# Patient Record
Sex: FEMALE | Race: ASIAN
[De-identification: names, ages, dates, MRNs, and addresses within clinical notes are randomized per-mention and may not be internally consistent; named-entity substitution may affect disease eponyms.]

---

## 2017-07-27 ENCOUNTER — HOSPITAL ENCOUNTER (EMERGENCY)
Dept: HOSPITAL 10 - E/R | Age: 77
Discharge: HOME | End: 2017-07-27
Payer: COMMERCIAL

## 2017-07-27 VITALS — DIASTOLIC BLOOD PRESSURE: 68 MMHG | SYSTOLIC BLOOD PRESSURE: 123 MMHG | RESPIRATION RATE: 16 BRPM | HEART RATE: 74 BPM

## 2017-07-27 VITALS
HEIGHT: 58 IN | BODY MASS INDEX: 24.06 KG/M2 | BODY MASS INDEX: 24.06 KG/M2 | WEIGHT: 114.64 LBS | HEIGHT: 58 IN | WEIGHT: 114.64 LBS

## 2017-07-27 DIAGNOSIS — R10.84: ICD-10-CM

## 2017-07-27 DIAGNOSIS — Z79.4: ICD-10-CM

## 2017-07-27 DIAGNOSIS — R11.2: Primary | ICD-10-CM

## 2017-07-27 DIAGNOSIS — Z79.84: ICD-10-CM

## 2017-07-27 DIAGNOSIS — R41.82: ICD-10-CM

## 2017-07-27 DIAGNOSIS — E11.65: ICD-10-CM

## 2017-07-27 LAB
ADD UMIC: YES
ALBUMIN SERPL-MCNC: 4.7 G/DL (ref 3.3–4.9)
ALBUMIN/GLOB SERPL: 1.17 {RATIO}
ALP SERPL-CCNC: 50 IU/L (ref 42–121)
ALT SERPL-CCNC: 23 IU/L (ref 13–69)
ANION GAP SERPL CALC-SCNC: 23 MMOL/L (ref 8–16)
APTT BLD: 30.9 SEC (ref 25–35)
AST SERPL-CCNC: 27 IU/L (ref 15–46)
BASOPHILS # BLD AUTO: 0 10^3/UL (ref 0–0.1)
BASOPHILS NFR BLD: 0.3 % (ref 0–2)
BILIRUB DIRECT SERPL-MCNC: 0 MG/DL (ref 0–0.2)
BILIRUB SERPL-MCNC: 0.2 MG/DL (ref 0.2–1.3)
BUN SERPL-MCNC: 21 MG/DL (ref 7–20)
CALCIUM SERPL-MCNC: 9.6 MG/DL (ref 8.4–10.2)
CHLORIDE SERPL-SCNC: 101 MMOL/L (ref 97–110)
CO2 SERPL-SCNC: 27 MMOL/L (ref 21–31)
COLOR UR: (no result)
CREAT SERPL-MCNC: 0.81 MG/DL (ref 0.44–1)
EOSINOPHIL # BLD: 0 10^3/UL (ref 0–0.5)
EOSINOPHIL NFR BLD: 0.4 % (ref 0–7)
ERYTHROCYTE [DISTWIDTH] IN BLOOD BY AUTOMATED COUNT: 13 % (ref 11.5–14.5)
GLOBULIN SER-MCNC: 4 G/DL (ref 1.3–3.2)
GLUCOSE SERPL-MCNC: 66 MG/DL (ref 70–220)
GLUCOSE UR STRIP-MCNC: (no result) MG/DL
HCT VFR BLD CALC: 41.7 % (ref 37–47)
HGB BLD-MCNC: 13.8 G/DL (ref 12–16)
INR PPP: 0.87
KETONES UR STRIP.AUTO-MCNC: NEGATIVE MG/DL
LYMPHOCYTES # BLD AUTO: 1.9 10^3/UL (ref 0.8–2.9)
LYMPHOCYTES NFR BLD AUTO: 23.3 % (ref 15–51)
MCH RBC QN AUTO: 30.4 PG (ref 29–33)
MCHC RBC AUTO-ENTMCNC: 33.1 G/DL (ref 32–37)
MCV RBC AUTO: 91.9 FL (ref 82–101)
MONOCYTES # BLD: 0.4 10^3/UL (ref 0.3–0.9)
MONOCYTES NFR BLD: 5.5 % (ref 0–11)
NEUTROPHILS # BLD: 5.6 10^3/UL (ref 1.6–7.5)
NEUTROPHILS NFR BLD AUTO: 70.2 % (ref 39–77)
NITRITE UR QL STRIP.AUTO: NEGATIVE MG/DL
NRBC # BLD MANUAL: 0 10^3/UL (ref 0–0)
NRBC BLD AUTO-RTO: 0 /100WBC (ref 0–0)
PLATELET # BLD: 302 10^3/UL (ref 140–415)
PMV BLD AUTO: 10 FL (ref 7.4–10.4)
POTASSIUM SERPL-SCNC: 4.2 MMOL/L (ref 3.5–5.1)
PROT SERPL-MCNC: 8.7 G/DL (ref 6.1–8.1)
PROTHROMBIN TIME: 11.8 SEC (ref 12.2–14.2)
PT RATIO: 0.9
RBC # BLD AUTO: 4.54 10^6/UL (ref 4.2–5.4)
RBC # UR AUTO: (no result) MG/DL
SODIUM SERPL-SCNC: 147 MMOL/L (ref 135–144)
TROPONIN I SERPL-MCNC: < 0.012 NG/ML (ref 0–0.12)
UR ASCORBIC ACID: NEGATIVE MG/DL
UR BILIRUBIN (DIP): NEGATIVE MG/DL
UR CLARITY: CLEAR
UR PH (DIP): 6 (ref 5–9)
UR RBC: 0 /HPF (ref 0–5)
UR SPECIFIC GRAVITY (DIP): 1 (ref 1–1.03)
UR TOTAL PROTEIN (DIP): NEGATIVE MG/DL
UROBILINOGEN UR STRIP-ACNC: NEGATIVE MG/DL
WBC # BLD AUTO: 8 10^3/UL (ref 4.8–10.8)
WBC # UR STRIP: (no result) LEU/UL

## 2017-07-27 PROCEDURE — 96374 THER/PROPH/DIAG INJ IV PUSH: CPT

## 2017-07-27 PROCEDURE — 85610 PROTHROMBIN TIME: CPT

## 2017-07-27 PROCEDURE — 74176 CT ABD & PELVIS W/O CONTRAST: CPT

## 2017-07-27 PROCEDURE — 82962 GLUCOSE BLOOD TEST: CPT

## 2017-07-27 PROCEDURE — 96375 TX/PRO/DX INJ NEW DRUG ADDON: CPT

## 2017-07-27 PROCEDURE — 85025 COMPLETE CBC W/AUTO DIFF WBC: CPT

## 2017-07-27 PROCEDURE — 71010: CPT

## 2017-07-27 PROCEDURE — 93005 ELECTROCARDIOGRAM TRACING: CPT

## 2017-07-27 PROCEDURE — 80053 COMPREHEN METABOLIC PANEL: CPT

## 2017-07-27 PROCEDURE — 85730 THROMBOPLASTIN TIME PARTIAL: CPT

## 2017-07-27 PROCEDURE — 81001 URINALYSIS AUTO W/SCOPE: CPT

## 2017-07-27 PROCEDURE — 96372 THER/PROPH/DIAG INJ SC/IM: CPT

## 2017-07-27 PROCEDURE — 84484 ASSAY OF TROPONIN QUANT: CPT

## 2017-07-27 PROCEDURE — 36415 COLL VENOUS BLD VENIPUNCTURE: CPT

## 2017-07-27 PROCEDURE — 70450 CT HEAD/BRAIN W/O DYE: CPT

## 2017-07-27 NOTE — RADRPT
PROCEDURE:   CT Brain without contrast. 

 

CLINICAL INDICATION:    Altered Mental Status 

 

TECHNIQUE:   A CT of the brain was performed on a GE [employer] 64-slice CT scanner utilizing axial 
imaging from the skull base through the vertex without IV contrast.  Multiplanar reformatted images 
were made.  Images were reviewed on a PACS workstation.  The CTDIvol is 43.58 mGy and the DLP is 630
.2 mGycm.  

 

COMPARISON:   CT brain 03/09/2016 and CT brain 03/06/2014 

 

FINDINGS:

There is no acute intracranial hemorrhage, midline shift, or mass effect.  No abnormal extra-axial f
luid collection is seen. The gray-white matter differentiation appears well preserved.

There is stable mild to moderate diffuse cerebral volume loss, likely age-related.

The previously seen focal encephalomalacia likely related to prior infarct in the anterior limb of t
he left internal capsule is identified, but is less conspicuous on the current study as compared to 
prior studies.

Scattered low attenuation in the periventricular deep white matter is nonspecific, but there compati
ble with chronic small vessel ischemic white matter disease, unchanged. Bilateral basal ganglia calc
ifications are unchanged and nonspecific.

A tiny posterior inferior right cerebellar hemisphere chronic lacunar infarct is unchanged.

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 The orbits, paranasal sinus, and mastoid air cells are partially i
jessica.  The imaged portions are otherwise grossly unremarkable.

Intracranial calcific atherosclerotic disease is noted in the internal carotid arteries bilaterally.

No acute fracture or suspicious osseous lesion is identified.

 

IMPRESSION:

 

1.  No evidence of acute intracranial pathology.

2.  Stable tiny chronic lacunar infarct in the right posterior inferior cerebellar hemisphere.

3. Focal encephalomalacia in the anterior limb of the left internal capsule, less conspicuous as com
pared to prior studies.

4. Stable mild to moderate diffuse cerebral volume loss, likely age-related.

5. Stable findings suggestive of mild chronic small vessel ischemic white matter disease.

 

 

RPTAT: PP

_____________________________________________ 

Physician Bryan           Date    Time 

Electronically viewed and signed by Physician Bryan on 07/27/2017 10:12 

 

D:  07/27/2017 10:12  T:  07/27/2017 10:12

RC/

## 2017-07-27 NOTE — RADRPT
PROCEDURE:   CT Abdomen and Pelvis without intravenous contrast. 

 

CLINICAL INDICATION:   Abdominal pain and vomiting.  . 

 

TECHNIQUE:   CT scan of the abdomen and pelvis without intravenous contrast was performed on a multi
-slice CT scanner. Coronal and sagittal reformatted images were obtained from the axial source image
s. Images were reviewed on a high-resolution PACS workstation. 

Total DLP =  296.8 mGy-cm. CTDIvol = 5.3 mGy.

One or more of the following dose reduction techniques were used:

Automated exposure control.

Adjustment of the mA and/or kV according to patient size.

Use of iterative reconstruction technique.

 

COMPARISON:   06/05/2016 

 

FINDINGS:

 

CT abdomen and pelvis:

 

The lung bases are clear. Calcified subcarinal lymph node is seen. The heart size is normal in size.
  The liver is normal in size and density.  There is intra and extrahepatic biliary dilatation with 
pneumobilia unchanged.  There is an internal biliary stent in place extending from the common bile d
uct into the second portion of the duodenum. There is no definite common bile duct stone or pancreat
ic mass is seen although sludge is seen at the distal common bile duct.  The spleen is normal in siz
e and homogeneous in density.   The pancreas as visualized is normal.  The gallbladder has been stehpanie
josué.  The adrenal glands are normal.  The kidneys are symmetrically unremarkable.  No urolithiasis, 
obstructive uropathy, or solid mass lesion is seen. There are bilateral renal cysts measuring up to 
5.5 cm in the left kidney unchanged. 

 

The stomach is partially collapsed, but is grossly unremarkable. The small bowels are unremarkable. 
The colon and rectum are normal. There are a few scattered colonic diverticula.  The appendix is nor
mal.  There is no evidence of appendicitis or diverticulitis. The uterus has been removed.. The blad
nayeli is normal. There is no abdominal or pelvic adenopathy, free fluid, free air, mass or mesenteric 
inflammation. 

 

The aorta is normal in caliber with calcific atherosclerosis . The osseous structures  showing degen
erative enthesopathy of the spine. There are no osteolytic or osteoblastic lesions identified..  The
 soft tissues are within normal limits.  Lack of IV and oral contrast limits sensitivity of exam. 

 

IMPRESSION:

 

1.  Intra and extrahepatic biliary dilatation with pneumobilia and internal biliary stent in place u
nchanged since prior exam.

2.  Bilateral renal cysts.

3.  Colonic diverticulosis without diverticulitis.

4.  Status post cholecystectomy and hysterectomy.

5.  Otherwise unremarkable noncontrast CT abdomen and pelvis without acute pathology identified.

 

RPTAT: JJ

_____________________________________________ 

.Tito Gamez MD, MD           Date    Time 

Electronically viewed and signed by .Tito Gamez MD, MD on 07/27/2017 10:10 

 

D:  07/27/2017 10:10  T:  07/27/2017 10:10

.L/

## 2017-07-27 NOTE — ERA
ER Documentation


Chief Complaint


Date/Time


DATE: 7/27/17 


TIME: 11:56


Chief Complaint


feels weak , dizziness , low blood glucose , abd pain , vomiting





HPI


This is a 77-year-old female diabetic who presents the emergency room with 

fatigue dizziness and vomiting.  The patient has multiple complaints usually 

history is provided by daughter.  The patient describes mild headache, 

abdominal pain that is cramping with associated nonbloody nonbilious emesis.  

Accu-Chek at home was in the 60s and she was given something to drink.  No 

recent fevers or chills.  The patient has had bladder infections in the past 

she denies any dysuria urgency or frequency.





ROS


All systems reviewed and are negative except as per history of present illness.





Medications


Home Meds


Active Scripts


Ondansetron (Ondansetron Odt) 4 Mg Tab.rapdis, 4 MG PO Q6H Y for NAUSEA AND/OR 

VOMITING, #30 TAB


   Prov:ZHEN VALERIO MD         7/27/17


Reported Medications


Insulin Glargine* (Lantus*) 100 Unit/Ml Soln, 14 UNIT SC DAILY, #1 VIAL


   7/27/17


Glipizide* (Glipizide*) 10 Mg Tablet, 10 MG PO AC BREAKFAST BEDTIME, TAB


   2/14/15


Omeprazole* (Prilosec*) 20 Mg Capsule.dr, 20 MG PO DAILY, CAP


   11/17/14


Losartan Potassium* (Losartan Potassium*) 25 Mg Tablet, 25 MG PO DAILY, TAB


   11/17/14


Pioglitazone Hcl* (Actos*) 45 Mg Tablet, 45 MG PO DAILY, TAB


   11/17/14


Metformin Hcl* (Metformin Hcl*) 850 Mg Tablet, 850 MG PO BID, TAB


   11/17/14


Discontinued Scripts


Ondansetron Hcl* (Zofran*) 4 Mg Tablet, 4 MG PO Q8H Y for NAUSEA AND/OR VOMITING

, #12 TAB


   Prov:MARTHA WALL DO         6/5/16


Famotidine* (Pepcid*) 20 Mg Tablet, 20 MG PO BID for 14 Days, TAB


   Prov:MARTHA WALL DO         6/5/16


Tramadol Hcl* (Ultram*) 50 Mg Tablet, 50 MG PO Q6H Y for PAIN, #14 TAB


   Prov:WINDY LOZANO DO         3/22/16


Omeprazole* (Omeprazole*) 40 Mg Capsule.dr, 40 MG PO DAILY, #30 CAP


   Prov:WINDY LOZANO DO         3/22/16


Ondansetron Hcl* (Zofran* ODT) 4 mg -ODT Tab.disper, 4 MG PO Q6 Y for NAUSEA AND

/OR VOMITING, #10 TAB


   Prov:WINDY LOZANO DO         3/22/16


Hydrocodone Bit-Acetaminophen* (Norco*) 5-325 Mg Tab, 1 TAB PO q6h Y for PAIN, #

10 TAB


   Prov:KHANH ENG NP         3/11/16


Levofloxacin* (Levofloxacin*) 250 Mg Tablet, 250 MG PO DAILY for 3 Days, TAB


   Prov:KHANH ENG NP         3/11/16





Allergies


Allergies:  


Coded Allergies:  


     No Known Allergy (Unverified , 7/27/17)





PMhx/Soc


History of Surgery:  Yes (cholecystectomy, hysterectomy)


Anesthesia Reaction:  No


Hx Neurological Disorder:  No


Hx Respiratory Disorders:  No


Hx Cardiac Disorders:  No


Hx Psychiatric Problems:  No


Hx Miscellaneous Medical Probl:  Yes (dm)


Hx Alcohol Use:  No


Hx Substance Use:  No


Hx Tobacco Use:  No


Smoking Status:  Never smoker





FmHx


Family History:  diabetes





Physical Exam


Vitals





Vital Signs








  Date Time  Temp Pulse Resp B/P Pulse Ox O2 Delivery O2 Flow Rate FiO2


 


7/27/17 08:19 97.8 87 18 149/67 99   








Physical Exam


General: Thin female, no significant distress


Head: Normocephalic, atraumatic.


Eyes: Pupils equally reactive, EOM intact


ENT: Moist mucous membranes


Neck: Supple, no lymphadenopathy


Respiratory: Lungs clear bilaterally, no distress


Cardiovascular: RRR, no murmurs, rubs, or gallops


Abdominal: Soft, non-tender, non-distended, no peritoneal signs


: Deferred


MSK: No edema, no unilateral swelling, 5/5 strength


Neurologic: Alert and oriented, moving all extremities, normal speech, no focal 

weakness, no cerebellar signs


Skin: No rash


Psych: Normal mood


Result Diagram:  


7/27/17 0900                                                                   

             7/27/17 0900





Results 24 hrs





 Laboratory Tests








Test


  7/27/17


08:31 7/27/17


09:00 7/27/17


09:21 7/27/17


10:27


 


Bedside Glucose 69mg/dL   201mg/dL  175mg/dL 


 


White Blood Count  8.010^3/ul   


 


Red Blood Count  4.5410^6/ul   


 


Hemoglobin  13.8g/dl   


 


Hematocrit  41.7%   


 


Mean Corpuscular Volume  91.9fl   


 


Mean Corpuscular Hemoglobin  30.4pg   


 


Mean Corpuscular Hemoglobin


Concent 


  33.1g/dl 


  


  


 


 


Red Cell Distribution Width  13.0%   


 


Platelet Count  27230^3/UL   


 


Mean Platelet Volume  10.0fl   


 


Neutrophils %  70.2%   


 


Lymphocytes %  23.3%   


 


Monocytes %  5.5%   


 


Eosinophils %  0.4%   


 


Basophils %  0.3%   


 


Nucleated Red Blood Cells %  0.0/100WBC   


 


Neutrophils #  5.610^3/ul   


 


Lymphocytes #  1.910^3/ul   


 


Monocytes #  0.410^3/ul   


 


Eosinophils #  0.010^3/ul   


 


Basophils #  0.010^3/ul   


 


Nucleated Red Blood Cells #  0.010^3/ul   


 


Prothrombin Time  11.8Sec   


 


Prothrombin Time Ratio  0.9   


 


INR International Normalized


Ratio 


  0.87 


  


  


 


 


Activated Partial


Thromboplast Time 


  30.9Sec 


  


  


 


 


Urine Color  STRAW   


 


Urine Clarity  CLEAR   


 


Urine pH  6.0   


 


Urine Specific Gravity  1.003   


 


Urine Ketones  NEGATIVEmg/dL   


 


Urine Nitrite  NEGATIVEmg/dL   


 


Urine Bilirubin  NEGATIVEmg/dL   


 


Urine Urobilinogen  NEGATIVEmg/dL   


 


Urine Leukocyte Esterase  TRACELeu/ul   


 


Urine Microscopic RBC  0/HPF   


 


Urine Microscopic WBC  2/HPF   


 


Urine Hemoglobin  1+mg/dL   


 


Urine Glucose  3+mg/dL   


 


Urine Total Protein  NEGATIVEmg/dl   


 


Sodium Level  147mmol/L   


 


Potassium Level  4.2mmol/L   


 


Chloride Level  101mmol/L   


 


Carbon Dioxide Level  27mmol/L   


 


Anion Gap  23   


 


Blood Urea Nitrogen  21mg/dl   


 


Creatinine  0.81mg/dl   


 


Glucose Level  66mg/dl   


 


Calcium Level  9.6mg/dl   


 


Total Bilirubin  0.2mg/dl   


 


Direct Bilirubin  0.00mg/dl   


 


Indirect Bilirubin  0.2mg/dl   


 


Aspartate Amino Transf


(AST/SGOT) 


  27IU/L 


  


  


 


 


Alanine Aminotransferase


(ALT/SGPT) 


  23IU/L 


  


  


 


 


Alkaline Phosphatase  50IU/L   


 


Troponin I  < 0.012ng/ml   


 


Total Protein  8.7g/dl   


 


Albumin  4.7g/dl   


 


Globulin  4.00g/dl   


 


Albumin/Globulin Ratio  1.17   














Test


  7/27/17


11:29 


  


  


 


 


Bedside Glucose 102mg/dL    








 Current Medications








 Medications


  (Trade)  Dose


 Ordered  Sig/Poly


 Route


 PRN Reason  Start Time


 Stop Time Status Last Admin


Dose Admin


 


 Sodium Chloride


  (NS)  500 ml @ 


 500 mls/hr  Q1H STAT


 IV


   7/27/17 08:31


 7/27/17 09:30 DC 7/27/17 08:42


 


 


 Dextrose


  (D50w Syringe)  50 ml  ONCE  STAT


 IV


   7/27/17 08:39


 7/27/17 08:43 DC 7/27/17 08:53


 


 


 Octreotide Acetate


  (Sandostatin)  50 mcg  ONCE  STAT


 SC


   7/27/17 08:39


 7/27/17 08:43 DC 7/27/17 10:16


 


 


 Ondansetron HCl


  (Zofran Inj)  4 mg  ONCE  STAT


 IV


   7/27/17 08:55


 7/27/17 08:57 DC 7/27/17 09:05


 











Procedures/MDM


EKG, MONITORS, & DIAGNOSTIC IMAGING:


EKG: I reviewed and interpreted a 12-lead EKG. 


Rhythm: Normal sinus rhythm


Ectopy: None


Intervals: No abnormalities


ST segments: No elevations or depressions


T waves: No contiguous inversions





CTB


IMPRESSION:


 


1.  No evidence of acute intracranial pathology.


2.  Stable tiny chronic lacunar infarct in the right posterior inferior 

cerebellar hemisphere.


3. Focal encephalomalacia in the anterior limb of the left internal capsule, 

less conspicuous as compared to prior studies.


4. Stable mild to moderate diffuse cerebral volume loss, likely age-related.


5. Stable findings suggestive of mild chronic small vessel ischemic white 

matter disease.


 


 


RPTAT: PP





CT a/p


IMPRESSION:


 


1.  Intra and extrahepatic biliary dilatation with pneumobilia and internal 

biliary stent in place unchanged since prior exam.


2.  Bilateral renal cysts.


3.  Colonic diverticulosis without diverticulitis.


4.  Status post cholecystectomy and hysterectomy.


5.  Otherwise unremarkable noncontrast CT abdomen and pelvis without acute 

pathology identified.


 


RPTAT: PAU


'Chest x-ray: I reviewed and interpreted a 1 view of the chest


Mediastinum: No enlargement


Cardiac silhouette: No cardiomegaly


Airspace: Clear lung fields bilaterally without evidence of pneumothorax


Bones: No evidence of fracture














LAB INTERPRETATION:


The patient has no leukocytosis,, stable serial  glucose values





MEDICAL DECISION MAKING:


The patient presents with hypoglycemia, multiple complaints including headache 

abdominal pain and nausea and vomiting.  Unclear etiology, the patient does 

take sulfonylurea and Lantus and took the doses this morning.  For this reason 

she requires close observation in the emergency room.  Given her age I believe 

laboratory testing and CT imaging of the head and abdomen and pelvis would be 

most reasonable.





ER COURSE:


The patient continues to be well-appearing in the emergency department.  The 

patient was given a complex carbohydrate meal she was given octreotide.  The 

patient's Accu-Chek initially was 201 and after an amp and has trended down to 

100 but has been stable for at least 3 hours.





The patient was given a p.o. challenge and tolerated this well.  She was 

observed with serial abdominal exams that are again benign.  I discussed 

inpatient versus outpatient management.  The patient and family feel 

comfortable going home with return precautions discussed and understood.  The 

daughter can check the glucose at home and was advised that if is low to call [phone] 

11 and return to the emergency department.





I kept the patient and/or family informed of laboratory and diagnostic imaging 

results throughout the emergency room course.





DISPOSITION PLAN:


We discussed follow up with the patient's primary care doctor within 24 to 48 

hours as needed.  We also discussed return to the emergency room for worsening 

symptoms or worsening condition.





Outpatient referral: [None required]





Discharge Medications:


Zofran





Departure


Diagnosis:  


 Primary Impression:  


 Nausea and vomiting


 Qualified Code:  R11.2 - Nausea and vomiting, intractability of vomiting not 

specified, unspecified vomiting type


 Additional Impressions:  


 Hyperglycemia


 Generalized abdominal pain


Condition:  Stable


Patient Instructions:  Nausea and Vomiting-Adult





Additional Instructions:  


Call your primary care doctor TOMORROW for an appointment during the next 2-3 

days.See the doctor sooner or return here if your condition worsens before your 

appointment time.











ZHEN VALERIO MD Jul 27, 2017 12:00

## 2017-07-27 NOTE — RADRPT
PROCEDURE:   XR Chest.

 

CLINICAL INDICATION:   Altered Mental Status.  Dyspnea.

 

TECHNIQUE:   Single frontal chest x-ray. 

 

COMPARISON:   03/09/2016

 

FINDINGS:

The lungs are clear of acute infiltrates, edema, effusions, or masses. There is elevation of the rig
ht hemidiaphragm. Calcific atherosclerosis of the aorta is present..  The cardiomediastinal silhouet
te is unremarkable. There is an old healed fracture of the left clavicle.  Cholecystectomy clips and
 internal biliary stent are again noted.  Pneumobilia is also seen..   

 

IMPRESSION:

No acute cardiopulmonary disease. 

Elevated right hemidiaphragm.

Status post cholecystectomy with internal biliary stent and pneumobilia.  

 

RPTAT: JJ

_____________________________________________ 

.Tito Gamez MD, MD           Date    Time 

Electronically viewed and signed by .Tito Gamez MD, MD on 07/27/2017 09:09 

 

D:  07/27/2017 09:09  T:  07/27/2017 09:09

.L/

## 2017-08-31 ENCOUNTER — HOSPITAL ENCOUNTER (EMERGENCY)
Dept: HOSPITAL 10 - E/R | Age: 77
Discharge: HOME | End: 2017-08-31
Payer: COMMERCIAL

## 2017-08-31 VITALS
SYSTOLIC BLOOD PRESSURE: 118 MMHG | RESPIRATION RATE: 20 BRPM | HEART RATE: 64 BPM | TEMPERATURE: 97.3 F | DIASTOLIC BLOOD PRESSURE: 68 MMHG

## 2017-08-31 VITALS
HEIGHT: 62 IN | HEIGHT: 62 IN | BODY MASS INDEX: 22.31 KG/M2 | WEIGHT: 121.25 LBS | BODY MASS INDEX: 22.31 KG/M2 | WEIGHT: 121.25 LBS

## 2017-08-31 DIAGNOSIS — Z79.82: ICD-10-CM

## 2017-08-31 DIAGNOSIS — E86.0: ICD-10-CM

## 2017-08-31 DIAGNOSIS — E11.9: ICD-10-CM

## 2017-08-31 DIAGNOSIS — N39.0: Primary | ICD-10-CM

## 2017-08-31 DIAGNOSIS — Z79.84: ICD-10-CM

## 2017-08-31 DIAGNOSIS — Z79.4: ICD-10-CM

## 2017-08-31 LAB
ADD UMIC: YES
ALBUMIN SERPL-MCNC: 4.2 G/DL (ref 3.3–4.9)
ALBUMIN/GLOB SERPL: 1.02 {RATIO}
ALP SERPL-CCNC: 43 IU/L (ref 42–121)
ALT SERPL-CCNC: 24 IU/L (ref 13–69)
ANION GAP SERPL CALC-SCNC: 20 MMOL/L (ref 8–16)
AST SERPL-CCNC: 24 IU/L (ref 15–46)
BASOPHILS # BLD AUTO: 0 10^3/UL (ref 0–0.1)
BASOPHILS NFR BLD: 0.3 % (ref 0–2)
BILIRUB DIRECT SERPL-MCNC: 0 MG/DL (ref 0–0.2)
BILIRUB SERPL-MCNC: 0.3 MG/DL (ref 0.2–1.3)
BUN SERPL-MCNC: 25 MG/DL (ref 7–20)
CALCIUM SERPL-MCNC: 9.6 MG/DL (ref 8.4–10.2)
CHLORIDE SERPL-SCNC: 101 MMOL/L (ref 97–110)
CO2 SERPL-SCNC: 27 MMOL/L (ref 21–31)
COLOR UR: YELLOW
CREAT SERPL-MCNC: 0.9 MG/DL (ref 0.44–1)
EOSINOPHIL # BLD: 0.1 10^3/UL (ref 0–0.5)
EOSINOPHIL NFR BLD: 0.9 % (ref 0–7)
ERYTHROCYTE [DISTWIDTH] IN BLOOD BY AUTOMATED COUNT: 13.9 % (ref 11.5–14.5)
GLOBULIN SER-MCNC: 4.1 G/DL (ref 1.3–3.2)
GLUCOSE SERPL-MCNC: 90 MG/DL (ref 70–220)
GLUCOSE UR STRIP-MCNC: NEGATIVE MG/DL
HCT VFR BLD CALC: 41.2 % (ref 37–47)
HGB BLD-MCNC: 13.1 G/DL (ref 12–16)
KETONES UR STRIP.AUTO-MCNC: NEGATIVE MG/DL
LYMPHOCYTES # BLD AUTO: 1.9 10^3/UL (ref 0.8–2.9)
LYMPHOCYTES NFR BLD AUTO: 29.3 % (ref 15–51)
MCH RBC QN AUTO: 29.6 PG (ref 29–33)
MCHC RBC AUTO-ENTMCNC: 31.8 G/DL (ref 32–37)
MCV RBC AUTO: 93 FL (ref 82–101)
MONOCYTES # BLD: 0.4 10^3/UL (ref 0.3–0.9)
MONOCYTES NFR BLD: 5.6 % (ref 0–11)
NEUTROPHILS NFR BLD AUTO: 63.6 % (ref 39–77)
NITRITE UR QL STRIP.AUTO: NEGATIVE MG/DL
NRBC # BLD MANUAL: 0 10^3/UL (ref 0–0)
NRBC BLD AUTO-RTO: 0 /100WBC (ref 0–0)
PLATELET # BLD: 311 10^3/UL (ref 140–415)
PMV BLD AUTO: 9.5 FL (ref 7.4–10.4)
POTASSIUM SERPL-SCNC: 5.1 MMOL/L (ref 3.5–5.1)
PROT SERPL-MCNC: 8.3 G/DL (ref 6.1–8.1)
RBC # BLD AUTO: 4.43 10^6/UL (ref 4.2–5.4)
RBC # UR AUTO: NEGATIVE MG/DL
SODIUM SERPL-SCNC: 143 MMOL/L (ref 135–144)
TROPONIN I SERPL-MCNC: < 0.012 NG/ML (ref 0–0.12)
UR ASCORBIC ACID: 40 MG/DL
UR BILIRUBIN (DIP): NEGATIVE MG/DL
UR CLARITY: CLEAR
UR PH (DIP): 5 (ref 5–9)
UR RBC: 3 /HPF (ref 0–5)
UR SPECIFIC GRAVITY (DIP): 1.02 (ref 1–1.03)
UR TOTAL PROTEIN (DIP): NEGATIVE MG/DL
UROBILINOGEN UR STRIP-ACNC: NEGATIVE MG/DL
WBC # BLD AUTO: 6.6 10^3/UL (ref 4.8–10.8)
WBC # UR STRIP: (no result) LEU/UL

## 2017-08-31 PROCEDURE — 74010: CPT

## 2017-08-31 PROCEDURE — 85025 COMPLETE CBC W/AUTO DIFF WBC: CPT

## 2017-08-31 PROCEDURE — 36415 COLL VENOUS BLD VENIPUNCTURE: CPT

## 2017-08-31 PROCEDURE — 81001 URINALYSIS AUTO W/SCOPE: CPT

## 2017-08-31 PROCEDURE — 84484 ASSAY OF TROPONIN QUANT: CPT

## 2017-08-31 PROCEDURE — 71010: CPT

## 2017-08-31 PROCEDURE — 83605 ASSAY OF LACTIC ACID: CPT

## 2017-08-31 PROCEDURE — 96374 THER/PROPH/DIAG INJ IV PUSH: CPT

## 2017-08-31 PROCEDURE — 93005 ELECTROCARDIOGRAM TRACING: CPT

## 2017-08-31 PROCEDURE — 80053 COMPREHEN METABOLIC PANEL: CPT

## 2017-08-31 PROCEDURE — 87086 URINE CULTURE/COLONY COUNT: CPT

## 2017-08-31 PROCEDURE — 96375 TX/PRO/DX INJ NEW DRUG ADDON: CPT

## 2017-08-31 NOTE — ERD
ER Documentation


Chief Complaint


Date/Time


DATE: 8/31/17 


TIME: 21:43


Chief Complaint


Complains of weakness x 4 days





HPI


77-year-old female comes emergency room with her daughter for 3 days of 

increasing mild generalized weakness with decreased appetite.  She is unaware 

of any dysuria and has no chest pain shortness of breath nausea vomiting fever 

or chills.  No trauma





ROS


All systems reviewed and are negative except as per history of present illness.





Medications


Home Meds


Reported Medications


Atorvastatin Calcium (Atorvastatin Calcium) 10 Mg Tablet, 10 MG PO QHS, #30 TAB


   8/31/17


Metformin Hcl* (Metformin Hcl*) 1,000 Mg Tablet, 1000 MG PO WITH BREAKFAST DINNE

, #60 TAB


   8/31/17


Aspirin* (Aspirin* EC) 81 Mg Tablet.dr, 81 MG PO DAILY, TAB


   8/31/17


Insulin Glargine* (Lantus*) 100 Unit/Ml Soln, 14 UNIT SC DAILY, #1 VIAL


   7/27/17


Glipizide* (Glipizide*) 10 Mg Tablet, 10 MG PO AC BREAKFAST BEDTIME, TAB


   2/14/15


Omeprazole* (Prilosec*) 20 Mg Capsule.dr, 20 MG PO DAILY, CAP


   11/17/14


Pioglitazone Hcl* (Actos*) 45 Mg Tablet, 45 MG PO DAILY, TAB


   11/17/14


Discontinued Reported Medications


Losartan Potassium* (Losartan Potassium*) 25 Mg Tablet, 25 MG PO DAILY, TAB


   11/17/14


Metformin Hcl* (Metformin Hcl*) 850 Mg Tablet, 850 MG PO BID, TAB


   11/17/14


Discontinued Scripts


Ondansetron (Ondansetron Odt) 4 Mg Tab.rapdis, 4 MG PO Q6H Y for NAUSEA AND/OR 

VOMITING, #30 TAB


   Prov:ZHEN VALERIO MD         7/27/17





Allergies


Allergies:  


Coded Allergies:  


     No Known Allergy (Unverified , 8/31/17)





PMhx/Soc


History of Surgery:  Yes (cholecystectomy, hysterectomy)


Anesthesia Reaction:  No


Hx Neurological Disorder:  No


Hx Respiratory Disorders:  No


Hx Cardiac Disorders:  No


Hx Psychiatric Problems:  No


Hx Miscellaneous Medical Probl:  Yes (dm)


Hx Alcohol Use:  No


Hx Substance Use:  No


Hx Tobacco Use:  No


Smoking Status:  Never smoker





Physical Exam


Vitals





Vital Signs








  Date Time  Temp Pulse Resp B/P Pulse Ox O2 Delivery O2 Flow Rate FiO2


 


8/31/17 21:19 98.0 70 20 132/70 98   


 


8/31/17 18:00 98.0 68 20 110/77 98   


 


8/31/17 13:20 98.7 83 20 108/62 98   








Physical Exam


Const:  []           No distress


Head:   Atraumatic 


Eyes:    Normal Conjunctiva


ENT:    Normal External Ears, Nose and Mouth.


Neck:               Full range of motion..~ No meningismus.


Resp:    Clear to auscultation bilaterally


Cardio:    Regular rate and rhythm, no murmurs


Abd:    Soft, non tender, non distended. Normal bowel sounds


Skin:    No petechiae or rashes


Back:    No midline or flank tenderness


Ext:    No cyanosis, or edema


Neur:    Awake and alertAnd oriented 3, no focal deficits


Psych:    Normal Mood and Affect


Result Diagram:  


8/31/17 1725                                                                   

             8/31/17 1725





Results 24 hrs





 Laboratory Tests








Test


  8/31/17


17:25 8/31/17


19:35


 


White Blood Count 6.610^3/ul  


 


Red Blood Count 4.4310^6/ul  


 


Hemoglobin 13.1g/dl  


 


Hematocrit 41.2%  


 


Mean Corpuscular Volume 93.0fl  


 


Mean Corpuscular Hemoglobin 29.6pg  


 


Mean Corpuscular Hemoglobin


Concent 31.8g/dl 


  


 


 


Red Cell Distribution Width 13.9%  


 


Platelet Count 80859^3/UL  


 


Mean Platelet Volume 9.5fl  


 


Neutrophils % 63.6%  


 


Lymphocytes % 29.3%  


 


Monocytes % 5.6%  


 


Eosinophils % 0.9%  


 


Basophils % 0.3%  


 


Nucleated Red Blood Cells % 0.0/100WBC  


 


Neutrophils # (Manual) 4.210^3/ul  


 


Lymphocytes # 1.910^3/ul  


 


Monocytes # 0.410^3/ul  


 


Eosinophils # 0.110^3/ul  


 


Basophils # 0.010^3/ul  


 


Nucleated Red Blood Cells # 0.010^3/ul  


 


Sodium Level 143mmol/L  


 


Potassium Level 5.1mmol/L  


 


Chloride Level 101mmol/L  


 


Carbon Dioxide Level 27mmol/L  


 


Anion Gap 20  


 


Blood Urea Nitrogen 25mg/dl  


 


Creatinine 0.90mg/dl  


 


Glucose Level 90mg/dl  


 


Lactic Acid Level 3.6mmol/L  


 


Calcium Level 9.6mg/dl  


 


Total Bilirubin 0.3mg/dl  


 


Direct Bilirubin 0.00mg/dl  


 


Indirect Bilirubin 0.3mg/dl  


 


Aspartate Amino Transf


(AST/SGOT) 24IU/L 


  


 


 


Alanine Aminotransferase


(ALT/SGPT) 24IU/L 


  


 


 


Alkaline Phosphatase 43IU/L  


 


Troponin I < 0.012ng/ml  


 


Total Protein 8.3g/dl  


 


Albumin 4.2g/dl  


 


Globulin 4.10g/dl  


 


Albumin/Globulin Ratio 1.02  


 


Urine Color  YELLOW 


 


Urine Clarity  CLEAR 


 


Urine pH  5.0 


 


Urine Specific Gravity  1.020 


 


Urine Ketones  NEGATIVEmg/dL 


 


Urine Nitrite  NEGATIVEmg/dL 


 


Urine Bilirubin  NEGATIVEmg/dL 


 


Urine Urobilinogen  NEGATIVEmg/dL 


 


Urine Leukocyte Esterase  TRACELeu/ul 


 


Urine Microscopic RBC  3/HPF 


 


Urine Microscopic WBC  10/HPF 


 


Urine Hemoglobin  NEGATIVEmg/dL 


 


Urine Glucose  NEGATIVEmg/dL 


 


Urine Total Protein  NEGATIVEmg/dl 








 Current Medications








 Medications


  (Trade)  Dose


 Ordered  Sig/Poly


 Route


 PRN Reason  Start Time


 Stop Time Status Last Admin


Dose Admin


 


 Sodium Chloride


  (NS)  1,000 ml @ 


 1,000 mls/hr  Q1H STAT


 IV


   8/31/17 16:56


 8/31/17 17:55 DC 8/31/17 17:25


 


 


 Ondansetron HCl 4


 mg  4 mg  ONCE  STAT


 IV


   8/31/17 16:56


 8/31/17 16:58 DC 8/31/17 17:25


 


 


 Sodium Chloride  1,000 ml @ 


 1,000 mls/hr  Q1H ONCE


 IV


   8/31/17 18:30


 8/31/17 19:29 DC 8/31/17 20:15


 


 


 Cefepime HCl


  (Maxipime 1gm/50


 ml (Pmx))  50 ml @ 


 100 mls/hr  ONCE  ONCE


 IVPB


   8/31/17 22:00


 8/31/17 22:29   


 











Procedures/MDM


Weakness and dehydration is likely secondary to UTI.  Elevated lactic acid 

without elevated creatinine.  Patient was hydrated 2 L of normal saline and 

given a gram of cefepime as urinalysis positive for leukocyte esterase and has 

10 white blood cells from a cath specimen.  Urine culture was taken to confirm 

this.  No signs of cardiac dysfunction or electrolyte abnormalities.Daughter in 

patient for her to go home this evening.  Able to able to care for patient at 

home.  Patient's vital signs are stable respect this decision.  I am going to 

discharge with Levaquin for 3 days as well as Zofran ODT.





EKG interpretation: Normal sinus rhythm rate of 69, normal axis, no ST or T-

wave changes concerning for acute ischemia.  Normal EKG


Chest x-ray interpretation: See no acute process, no infiltrates, no 

pneumothorax, no fractures, no pulmonary edema


X-ray abdomen: I see no acute process.  Pneumobilia similar to prior exam, no 

obstruction, no free air, no fractures.





Departure


Diagnosis:  


 Primary Impression:  


 Urinary tract infection


 Additional Impression:  


 Dehydration


Condition:  Stable











ABRAHAM JOYCE DO Aug 31, 2017 21:50

## 2017-08-31 NOTE — RADRPT
PROCEDURE:   XR Abdomen. 

 

CLINICAL INDICATION:  Epigastric pain.

 

TECHNIQUE:   AP abdomen x-ray. 

 

COMPARISON:  CT examination of the abdomen and pelvis dated 07/27/2017.

 

FINDINGS:

Biliary stent is seen over the right abdomen.  The proximal stent is likely present in the distal co
mmon bile duct in the distal stent is likely present within the duodenum. These findings are compati
ble with CT examination the abdomen and pelvis dated 07/27/2017.  Stent position cannot be further e
valuated on the present series. Pneumobilia again seen, with the air distended common bile duct nandini
uring up to about 25 mm.

 

The bowel gas pattern is normal. There is no evidence of obstruction. There are no abnormal calcific
ations overlying the urinary tracts.

The osseus structures are unremarkable.  

 

IMPRESSION:

1.  Pneumobilia and biliary stent again seen, similar in appearance to CT examination dated 07/27/20
17.

2.  Otherwise, nonobstructive and nonspecific bowel gas pattern.

 

RPTAT: UU

_____________________________________________ 

Physician Giana           Date    Time 

Electronically viewed and signed by Physician Giana on 08/31/2017 19:50 

 

D:  08/31/2017 19:50  T:  08/31/2017 19:50

RS/

## 2017-08-31 NOTE — RADRPT
PROCEDURE:   XR Chest. 

 

CLINICAL INDICATION:  Abdominal pain and weakness

 

TECHNIQUE:   Single frontal view of the chest was obtained

 

COMPARISON:   07/27/2017 

 

FINDINGS:

 

No pleural effusion or pneumothorax.

 

No consolidation.

 

Staple cardiomediastinal silhouette.

 

No acute osseous abnormality. Fracture deformity of the left clavicle.

 

IMPRESSION:

 

No acute cardiopulmonary disease.

 

RPTAT: EE

_____________________________________________ 

Hoa Oscar Physician           Date    Time 

Electronically viewed and signed by Hoa Oscar Physician on 08/31/2017 17:13 

 

D:  08/31/2017 17:13  T:  08/31/2017 17:13

/

## 2017-09-07 ENCOUNTER — HOSPITAL ENCOUNTER (OUTPATIENT)
Dept: HOSPITAL 10 - E/R | Age: 77
Setting detail: OBSERVATION
LOS: 3 days | Discharge: HOME | End: 2017-09-10
Payer: COMMERCIAL

## 2017-09-07 VITALS
BODY MASS INDEX: 23.81 KG/M2 | HEIGHT: 60 IN | BODY MASS INDEX: 23.81 KG/M2 | WEIGHT: 121.25 LBS | WEIGHT: 121.25 LBS | HEIGHT: 60 IN

## 2017-09-07 VITALS — TEMPERATURE: 98.6 F

## 2017-09-07 DIAGNOSIS — Z79.82: ICD-10-CM

## 2017-09-07 DIAGNOSIS — E87.2: ICD-10-CM

## 2017-09-07 DIAGNOSIS — Z83.3: ICD-10-CM

## 2017-09-07 DIAGNOSIS — R10.9: ICD-10-CM

## 2017-09-07 DIAGNOSIS — E86.0: ICD-10-CM

## 2017-09-07 DIAGNOSIS — Z90.49: ICD-10-CM

## 2017-09-07 DIAGNOSIS — N39.0: ICD-10-CM

## 2017-09-07 DIAGNOSIS — E11.649: Primary | ICD-10-CM

## 2017-09-07 DIAGNOSIS — I10: ICD-10-CM

## 2017-09-07 DIAGNOSIS — Z79.4: ICD-10-CM

## 2017-09-07 LAB
ADD UMIC: YES
ANION GAP SERPL CALC-SCNC: 11 MMOL/L (ref 8–16)
BASOPHILS # BLD AUTO: 0 10^3/UL (ref 0–0.1)
BASOPHILS NFR BLD: 0.1 % (ref 0–2)
BUN SERPL-MCNC: 23 MG/DL (ref 7–20)
CALCIUM SERPL-MCNC: 8.4 MG/DL (ref 8.4–10.2)
CHLORIDE SERPL-SCNC: 103 MMOL/L (ref 97–110)
CO2 SERPL-SCNC: 25 MMOL/L (ref 21–31)
COLOR UR: (no result)
CREAT SERPL-MCNC: 0.94 MG/DL (ref 0.44–1)
EOSINOPHIL # BLD: 0.1 10^3/UL (ref 0–0.5)
EOSINOPHIL NFR BLD: 0.8 % (ref 0–7)
ERYTHROCYTE [DISTWIDTH] IN BLOOD BY AUTOMATED COUNT: 13.7 % (ref 11.5–14.5)
GLUCOSE SERPL-MCNC: 170 MG/DL (ref 70–220)
GLUCOSE UR STRIP-MCNC: NEGATIVE MG/DL
HCT VFR BLD CALC: 38.8 % (ref 37–47)
HGB BLD-MCNC: 12.7 G/DL (ref 12–16)
KETONES UR STRIP.AUTO-MCNC: NEGATIVE MG/DL
LYMPHOCYTES # BLD AUTO: 2.2 10^3/UL (ref 0.8–2.9)
LYMPHOCYTES NFR BLD AUTO: 30.2 % (ref 15–51)
MCH RBC QN AUTO: 30.3 PG (ref 29–33)
MCHC RBC AUTO-ENTMCNC: 32.7 G/DL (ref 32–37)
MCV RBC AUTO: 92.6 FL (ref 82–101)
MONOCYTES # BLD: 0.5 10^3/UL (ref 0.3–0.9)
MONOCYTES NFR BLD: 6.7 % (ref 0–11)
MUCOUS THREADS #/AREA URNS HPF: (no result) /HPF
NEUTROPHILS NFR BLD AUTO: 61.8 % (ref 39–77)
NITRITE UR QL STRIP.AUTO: NEGATIVE MG/DL
NRBC # BLD MANUAL: 0 10^3/UL (ref 0–0)
NRBC BLD AUTO-RTO: 0 /100WBC (ref 0–0)
PLATELET # BLD: 309 10^3/UL (ref 140–415)
PMV BLD AUTO: 9.6 FL (ref 7.4–10.4)
POTASSIUM SERPL-SCNC: 3.7 MMOL/L (ref 3.5–5.1)
RBC # BLD AUTO: 4.19 10^6/UL (ref 4.2–5.4)
RBC # UR AUTO: NEGATIVE MG/DL
SODIUM SERPL-SCNC: 135 MMOL/L (ref 135–144)
SQUAMOUS #/AREA URNS HPF: (no result) /HPF
UR ASCORBIC ACID: NEGATIVE MG/DL
UR BILIRUBIN (DIP): NEGATIVE MG/DL
UR CLARITY: (no result)
UR PH (DIP): 5 (ref 5–9)
UR RBC: 1 /HPF (ref 0–5)
UR SPECIFIC GRAVITY (DIP): 1.03 (ref 1–1.03)
UR TOTAL PROTEIN (DIP): (no result) MG/DL
UROBILINOGEN UR STRIP-ACNC: (no result) MG/DL
WBC # BLD AUTO: 7.4 10^3/UL (ref 4.8–10.8)
WBC # UR STRIP: (no result) LEU/UL

## 2017-09-07 PROCEDURE — 87086 URINE CULTURE/COLONY COUNT: CPT

## 2017-09-07 PROCEDURE — 96375 TX/PRO/DX INJ NEW DRUG ADDON: CPT

## 2017-09-07 PROCEDURE — 83036 HEMOGLOBIN GLYCOSYLATED A1C: CPT

## 2017-09-07 PROCEDURE — 96361 HYDRATE IV INFUSION ADD-ON: CPT

## 2017-09-07 PROCEDURE — 74000: CPT

## 2017-09-07 PROCEDURE — 81001 URINALYSIS AUTO W/SCOPE: CPT

## 2017-09-07 PROCEDURE — 80053 COMPREHEN METABOLIC PANEL: CPT

## 2017-09-07 PROCEDURE — 36415 COLL VENOUS BLD VENIPUNCTURE: CPT

## 2017-09-07 PROCEDURE — 96372 THER/PROPH/DIAG INJ SC/IM: CPT

## 2017-09-07 PROCEDURE — 84443 ASSAY THYROID STIM HORMONE: CPT

## 2017-09-07 PROCEDURE — 82553 CREATINE MB FRACTION: CPT

## 2017-09-07 PROCEDURE — 80048 BASIC METABOLIC PNL TOTAL CA: CPT

## 2017-09-07 PROCEDURE — 99217: CPT

## 2017-09-07 PROCEDURE — 87040 BLOOD CULTURE FOR BACTERIA: CPT

## 2017-09-07 PROCEDURE — 96365 THER/PROPH/DIAG IV INF INIT: CPT

## 2017-09-07 PROCEDURE — 80061 LIPID PANEL: CPT

## 2017-09-07 PROCEDURE — 84484 ASSAY OF TROPONIN QUANT: CPT

## 2017-09-07 PROCEDURE — 83605 ASSAY OF LACTIC ACID: CPT

## 2017-09-07 PROCEDURE — 82962 GLUCOSE BLOOD TEST: CPT

## 2017-09-07 PROCEDURE — 96374 THER/PROPH/DIAG INJ IV PUSH: CPT

## 2017-09-07 PROCEDURE — G0378 HOSPITAL OBSERVATION PER HR: HCPCS

## 2017-09-07 PROCEDURE — 82550 ASSAY OF CK (CPK): CPT

## 2017-09-07 PROCEDURE — 85025 COMPLETE CBC W/AUTO DIFF WBC: CPT

## 2017-09-07 PROCEDURE — 83735 ASSAY OF MAGNESIUM: CPT

## 2017-09-07 NOTE — RADRPT
PROCEDURE:   Abdominal radiograph

 

CLINICAL INDICATION:  Abdominal Pain. 

 

COMPARISON:  Radiograph from 08/20/2015.

 

TECHNIQUE:   AP view of the abdomen.

 

FINDINGS:

 

The visualized lung bases are clear.

 

Cholecystectomy.

Common biliary duct  stent.

 

No free air.

No distended loops of bowel.

No fluid levels.

There is a nonobstructive bowel pattern.

 

No suspicious calcifications.

Mild degenerative changes of the lower lumbar spine.

 

IMPRESSION:

 

No bowel obstruction identified.

 

 

 

RPTAT: VPH

_____________________________________________ 

Physician Farida           Date    Time 

Electronically viewed and signed by Physician Farida on 09/07/2017 21:18 

 

D:  09/07/2017 21:18  T:  09/07/2017 21:18

LG/

## 2017-09-07 NOTE — ERD
ER Documentation


Chief Complaint


Date/Time


DATE: 9/7/17 


TIME: 19:30


Chief Complaint


VOMITING,ABDOMINAL PAIN,POOR APPETITE X 3DAYS





HPI


Patient is a 77-year-old female recently discharged from the hospital with UTI 

and on antibiotics who presents to the ER with gradual onset, intermittent, 

moderate nonbloody nonbilious vomiting for 2-3 days.  Her family reports that 

she has not been eating well since she left the hospital and has had 

constipation.  She has had numerous episodes of vomiting in the last 2-3 days.  

She denies fever, headache, abdominal pain.





ROS


All systems reviewed and are negative except as per history of present illness.





Medications


Home Meds


Reported Medications


Pioglitazone Hcl* (Pioglitazone Hcl*) 15 Mg Tablet, 15 MG PO DAILY, TAB


   9/7/17


Glipizide* (Glipizide*) 5 Mg Tablet, 5 MG PO AC BREAKFAST DINNER, TAB


   9/7/17


Metoprolol Succinate* (Toprol XL*) 25 Mg Tab.sr.24h, 25 MG PO DAILY, #30 TAB


   9/7/17


[Basaglar 100/Ml ]   No Conflict Check, 14 UNITS SQ DAILY


   [id number]


   9/7/17


Atorvastatin Calcium (Atorvastatin Calcium) 10 Mg Tablet, 10 MG PO QHS, #30 TAB


   8/31/17


Metformin Hcl* (Metformin Hcl*) 1,000 Mg Tablet, 1000 MG PO WITH BREAKFAST DINNE

, #60 TAB


   8/31/17


Aspirin* (Aspirin* EC) 81 Mg Tablet.dr, 81 MG PO DAILY, TAB


   8/31/17


Omeprazole* (Prilosec*) 20 Mg Capsule.dr, 20 MG PO DAILY for AS NEEDED, CAP


   11/17/14


Discontinued Reported Medications


Insulin Glargine* (Lantus*) 100 Unit/Ml Soln, 14 UNIT SC DAILY, #1 VIAL


   7/27/17


Glipizide* (Glipizide*) 10 Mg Tablet, 10 MG PO AC BREAKFAST BEDTIME, TAB


   2/14/15


Pioglitazone Hcl* (Actos*) 45 Mg Tablet, 45 MG PO DAILY, TAB


   11/17/14


Losartan Potassium* (Losartan Potassium*) 25 Mg Tablet, 25 MG PO DAILY, TAB


   11/17/14


Metformin Hcl* (Metformin Hcl*) 850 Mg Tablet, 850 MG PO BID, TAB


   11/17/14


Discontinued Scripts


Ondansetron (Zofran Odt) 4 Mg Tab.rapdis, 4 MG PO Q6, #10


   Prov:ABRAHAM JOYCE DO         8/31/17


Levofloxacin* (Levaquin*) 500 Mg Tablet, 500 MG PO DAILY for 7 Days, TAB


   Prov:ABRAHAM JOYCE DO         8/31/17


Ondansetron (Ondansetron Odt) 4 Mg Tab.rapdis, 4 MG PO Q6H Y for NAUSEA AND/OR 

VOMITING, #30 TAB


   Prov:ZHEN VALERIO MD         7/27/17





Allergies


Allergies:  


Coded Allergies:  


     No Known Allergy (Unverified , 9/7/17)





PMhx/Soc


Past medical history: Hypertension, diabetes


Past surgical history: Hysterectomy, cholecystectomy


Social history: Denies tobacco or alcohol


History of Surgery:  Yes (cholecystectomy, hysterectomy)


Anesthesia Reaction:  No


Hx Neurological Disorder:  No


Hx Respiratory Disorders:  No


Hx Cardiac Disorders:  No


Hx Psychiatric Problems:  No


Hx Miscellaneous Medical Probl:  Yes (dm)


Hx Alcohol Use:  No


Hx Substance Use:  No


Hx Tobacco Use:  No





FmHx


Family History:  diabetes, 


   No coronary disease





Physical Exam


Vitals





Vital Signs








  Date Time  Temp Pulse Resp B/P Pulse Ox O2 Delivery O2 Flow Rate FiO2


 


9/7/17 21:34 98.6 80 17 110/57 95 Room Air  


 


9/7/17 19:39 98.6 80 17 105/57 100 Room Air  


 


9/7/17 16:46 98.5 86 18 110/58 98   








Physical Exam


Const:     Alert, no acute distress


Head:   Atraumatic 


Eyes:    Normal Conjunctiva, No pallor, no icterus


ENT:    Normal External Ears, Nose and Mouth.Mucous membranes moist


Neck:               Full range of motion..~ No meningismus.


Resp:    Clear to auscultation bilaterally, No wheezes, no rales


Cardio:    Regular rate and rhythm, no murmurs


Abd:    Soft, non tender, non distended. 


Skin:    No petechiae or rashes


Back:    No midline or flank tenderness


Ext:    No cyanosis, or edema


Neur:    Awake and alert, Cranial nerves II through XII intact bilaterally, 

strength and sensation full in 4 extremities.


Psych:    Normal Mood and Affect


Result Diagram:  


9/7/17 1930                                                                    

            9/7/17 2106





Results 24 hrs





 Laboratory Tests








Test


  9/7/17


19:30 9/7/17


20:58 9/7/17


21:06 9/7/17


21:16


 


White Blood Count 7.410^3/ul    


 


Red Blood Count 4.1910^6/ul    


 


Hemoglobin 12.7g/dl    


 


Hematocrit 38.8%    


 


Mean Corpuscular Volume 92.6fl    


 


Mean Corpuscular Hemoglobin 30.3pg    


 


Mean Corpuscular Hemoglobin


Concent 32.7g/dl 


  


  


  


 


 


Red Cell Distribution Width 13.7%    


 


Platelet Count 87177^3/UL    


 


Mean Platelet Volume 9.6fl    


 


Neutrophils % 61.8%    


 


Lymphocytes % 30.2%    


 


Monocytes % 6.7%    


 


Eosinophils % 0.8%    


 


Basophils % 0.1%    


 


Nucleated Red Blood Cells % 0.0/100WBC    


 


Neutrophils # (Manual) 4.510^3/ul    


 


Lymphocytes # 2.210^3/ul    


 


Monocytes # 0.510^3/ul    


 


Eosinophils # 0.110^3/ul    


 


Basophils # 0.010^3/ul    


 


Nucleated Red Blood Cells # 0.010^3/ul    


 


Urine Color ROMINA    


 


Urine Clarity


  SLIGHTLY


CLOUDY 


  


  


 


 


Urine pH 5.0    


 


Urine Specific Gravity 1.029    


 


Urine Ketones NEGATIVEmg/dL    


 


Urine Nitrite NEGATIVEmg/dL    


 


Urine Bilirubin NEGATIVEmg/dL    


 


Urine Urobilinogen 1+mg/dL    


 


Urine Leukocyte Esterase TRACELeu/ul    


 


Urine Microscopic RBC 1/HPF    


 


Urine Microscopic WBC 10/HPF    


 


Urine Squamous Epithelial


Cells FEW/HPF 


  


  


  


 


 


Urine Mucus MANY/HPF    


 


Urine Hemoglobin NEGATIVEmg/dL    


 


Urine Glucose NEGATIVEmg/dL    


 


Urine Total Protein 1+mg/dl    


 


Bedside Glucose  26mg/dL   137mg/dL 


 


Sodium Level   135mmol/L  


 


Potassium Level   3.7mmol/L  


 


Chloride Level   103mmol/L  


 


Carbon Dioxide Level   25mmol/L  


 


Anion Gap   11  


 


Blood Urea Nitrogen   23mg/dl  


 


Creatinine   0.94mg/dl  


 


Glucose Level   170mg/dl  


 


Calcium Level   8.4mg/dl  














Test


  9/7/17


21:58 


  


  


 


 


Bedside Glucose 162mg/dL    








 Current Medications








 Medications


  (Trade)  Dose


 Ordered  Sig/Poly


 Route


 PRN Reason  Start Time


 Stop Time Status Last Admin


Dose Admin


 


 Sodium Chloride


  (NS)  500 ml @ 


 500 mls/hr  Q1H STAT


 IV


   9/7/17 19:20


 9/7/17 20:19 DC 9/7/17 19:32


 


 


 Ondansetron HCl


  (Zofran Inj)  4 mg  ONCE  STAT


 IV


   9/7/17 19:20


 9/7/17 19:21 DC 9/7/17 19:33


 


 


 Famotidine


  (Pepcid Iv)  20 mg  ONCE  STAT


 IV


   9/7/17 19:20


 9/7/17 19:21 DC 9/7/17 19:33


 


 


 Octreotide Acetate


  (Sandostatin)  100 mcg  ONCE  ONCE


 SC


   9/7/17 21:00


 9/7/17 21:00 DC  


 


 


 Octreotide Acetate


  (Sandostatin)  50 mcg  ONCE  ONCE


 IV


   9/7/17 21:00


 9/7/17 21:01 DC 9/7/17 21:25


 


 


 Dextrose


  (D50w Syringe)  50 ml  STK-MED ONCE


 .ROUTE


   9/7/17 20:55


 9/7/17 20:56 DC  


 


 


 Dextrose


  (D50w Syringe)  50 ml  ONCE  ONCE


 IV


   9/7/17 21:00


 9/7/17 21:23 DC 9/7/17 21:25


 


 


 Sodium Chloride


 1710 ml  1,710 ml  BOLUS OVER 2 HOURS ONCE


 IV*


   9/7/17 22:10


 9/7/17 22:15 DC  


 


 


 Ceftriaxone Sodium


  (Rocephin)  50 ml @ 


 100 mls/hr  ONCE  ONCE


 IVPB


   9/7/17 22:15


 9/7/17 22:44 DC  


 


 


 Ondansetron HCl


  (Zofran Inj)  4 mg  ER BRIDGE PRN


 IV


 NAUSEA AND/OR VOMITING  9/7/17 23:00


 9/8/17 22:59   


 


 


 Acetaminophen


  (Tylenol Tab)  650 mg  ER BRIDGE PRN


 PO


 MILD PAIN/FEVER  9/7/17 23:00


 9/8/17 22:59   


 











Procedures/MDM


MDM: Patient is a 77-year-old female who presents to the ER with vomiting for 2 

days.  She was found to have a blood sugar in the 20s.  She was given IV 

dextrose, and was also given IV octreotide due to being on a sulfonylurea.  The 

patient is also on insulin.  He is able to tolerate oral intake, and her 

glucose stabilized.  Additional workup revealed a possible UTI.  She had a 

benign abdominal exam and no signs of central vomiting such as neurologic 

deficit or headache.  Blood and urine cultures were sent and a lactate will be 

checked. She has low diastolic blood pressure but normal map and systolic blood 

pressure. She was given IV antibiotics and fluids.  She will be admitted to 

observation for monitoring of glucose until stabilized and for further 

infectious workup.





Departure


Diagnosis:  


 Primary Impression:  


 Hypoglycemia


 Additional Impressions:  


 Urinary tract infection


 Urinary tract infection type:  site unspecified  Hematuria presence:  without 

hematuria  Qualified Code:  N39.0 - Urinary tract infection without hematuria, 

site unspecified


 Vomiting


 Vomiting type:  unspecified  Vomiting Intractability:  non-intractable  Nausea 

presence:  with nausea  Qualified Code:  R11.2 - Non-intractable vomiting with 

nausea, unspecified vomiting type


Condition:  LINO Rodriguez MD Sep 7, 2017 19:32

## 2017-09-08 VITALS — SYSTOLIC BLOOD PRESSURE: 111 MMHG | RESPIRATION RATE: 20 BRPM | DIASTOLIC BLOOD PRESSURE: 60 MMHG

## 2017-09-08 VITALS — HEART RATE: 63 BPM

## 2017-09-08 VITALS — RESPIRATION RATE: 20 BRPM | SYSTOLIC BLOOD PRESSURE: 109 MMHG | DIASTOLIC BLOOD PRESSURE: 55 MMHG

## 2017-09-08 VITALS — SYSTOLIC BLOOD PRESSURE: 130 MMHG | RESPIRATION RATE: 16 BRPM | DIASTOLIC BLOOD PRESSURE: 62 MMHG

## 2017-09-08 VITALS — RESPIRATION RATE: 20 BRPM | SYSTOLIC BLOOD PRESSURE: 114 MMHG | DIASTOLIC BLOOD PRESSURE: 59 MMHG

## 2017-09-08 VITALS — HEART RATE: 69 BPM

## 2017-09-08 VITALS — DIASTOLIC BLOOD PRESSURE: 51 MMHG | SYSTOLIC BLOOD PRESSURE: 101 MMHG | RESPIRATION RATE: 18 BRPM

## 2017-09-08 VITALS — HEART RATE: 62 BPM

## 2017-09-08 VITALS — HEART RATE: 68 BPM

## 2017-09-08 VITALS — HEART RATE: 71 BPM

## 2017-09-08 VITALS — HEART RATE: 59 BPM

## 2017-09-08 LAB
CK MB SERPL-MCNC: 0.56 NG/ML (ref 0–2.4)
CK MB SERPL-MCNC: 0.71 NG/ML (ref 0–2.4)
CK SERPL-CCNC: 32 IU/L (ref 23–200)
CK SERPL-CCNC: 35 IU/L (ref 23–200)
TROPONIN I SERPL-MCNC: < 0.012 NG/ML (ref 0–0.12)
TROPONIN I SERPL-MCNC: < 0.012 NG/ML (ref 0–0.12)

## 2017-09-08 RX ADMIN — DIPHENHYDRAMINE HYDROCHLORIDE SCH MG: 50 INJECTION, SOLUTION INTRAMUSCULAR; INTRAVENOUS at 09:08

## 2017-09-08 RX ADMIN — DIPHENHYDRAMINE HYDROCHLORIDE SCH MG: 50 INJECTION, SOLUTION INTRAMUSCULAR; INTRAVENOUS at 01:30

## 2017-09-08 RX ADMIN — POTASSIUM CHLORIDE SCH MLS/HR: 2 INJECTION, SOLUTION, CONCENTRATE INTRAVENOUS at 15:31

## 2017-09-08 RX ADMIN — CEFTRIAXONE SCH MLS/HR: 1 INJECTION, SOLUTION INTRAVENOUS at 13:37

## 2017-09-08 NOTE — PN
Date/Time of Note


Date/Time of Note


DATE: 9/8/17 


TIME: 12:21





Assessment/Plan


VTE Prophylaxis


VTE Prophylaxis Intervention:  SCD's





Lines/Catheters


IV Catheter Type (from Nrsg):  Peripheral IV





Assessment/Plan


Assessment/Plan





#1 symptomatic hypoglycemia: Likely multifactorial secondary to underlying 

urinary tract infection and decreased appetite and continued medication use.  

Patient received Sandostatin in the ED.  Currently will put her on a D5 water 

as she has poor appetite at this time.  Will continue to monitor blood sugars.  

Hold home diabetes medication at this time.





#2 lactic acidosis: Again likely multifactorial secondary to urinary tract 

infection, dehydration from poor appetite, and metformin.  Will provide IV 

fluid hydration at this time will hold metformin.  Provide antibiotics for 

urinary tract infection.  Will trend lactate.





#3 abdominal pain: This likely could be secondary to patient's generalized 

illness as well as UTI however in the setting of her age and being a female as 

well as lactic acidosis I will also check cardiac enzymes.  There is no rectal 

bleeding at this time.





#3 dehydration: Patient received normal saline boluses in the ED will continue 

normal saline bolus as well as D5 water secondary to #1.





#4 urinary tract infection: The current time will treat with ceftriaxone.  Will 

await urine culture





#5 DVT GI prophylaxis: SCDs, acid blocker





Exam/Review of Systems


Vital Signs


Vitals





 Vital Signs








  Date Time  Temp Pulse Resp B/P Pulse Ox O2 Delivery O2 Flow Rate FiO2


 


9/8/17 11:29 98.0 63 20 114/59 95   


 


9/8/17 00:13      Room Air  











Results


Result Diagram:  


9/7/17 1930 9/7/17 2106





Results 24 hrs





Laboratory Tests








Test


  9/7/17


19:30 9/7/17


20:58 9/7/17


21:06 9/7/17


21:16


 


White Blood Count 7.4     


 


Red Blood Count 4.19  L   


 


Hemoglobin 12.7     


 


Hematocrit 38.8     


 


Mean Corpuscular Volume 92.6     


 


Mean Corpuscular Hemoglobin 30.3     


 


Mean Corpuscular Hemoglobin


Concent 32.7  


  


  


  


 


 


Red Cell Distribution Width 13.7     


 


Platelet Count 309     


 


Mean Platelet Volume 9.6     


 


Neutrophils % 61.8     


 


Lymphocytes % 30.2     


 


Monocytes % 6.7     


 


Eosinophils % 0.8     


 


Basophils % 0.1     


 


Nucleated Red Blood Cells % 0.0     


 


Neutrophils # (Manual) 4.5     


 


Lymphocytes # 2.2     


 


Monocytes # 0.5     


 


Eosinophils # 0.1     


 


Basophils # 0.0     


 


Nucleated Red Blood Cells # 0.0     


 


Urine Color ROMINA     


 


Urine Clarity


  SLIGHTLY


CLOUDY  A 


  


  


 


 


Urine pH 5.0     


 


Urine Specific Gravity 1.029     


 


Urine Ketones NEGATIVE     


 


Urine Nitrite NEGATIVE     


 


Urine Bilirubin NEGATIVE     


 


Urine Urobilinogen 1+  H   


 


Urine Leukocyte Esterase TRACE  A   


 


Urine Microscopic RBC 1     


 


Urine Microscopic WBC 10  H   


 


Urine Squamous Epithelial


Cells FEW  


  


  


  


 


 


Urine Mucus MANY  A   


 


Urine Hemoglobin NEGATIVE     


 


Urine Glucose NEGATIVE     


 


Urine Total Protein 1+  H   


 


Bedside Glucose  26  *L  137  


 


Sodium Level   135   


 


Potassium Level   3.7   


 


Chloride Level   103   


 


Carbon Dioxide Level   25   


 


Anion Gap   11   


 


Blood Urea Nitrogen   23  H 


 


Creatinine   0.94   


 


Glucose Level   170   


 


Calcium Level   8.4   














Test


  9/7/17


21:58 9/7/17


23:31 9/8/17


02:09 9/8/17


03:04


 


Bedside Glucose 162     97  


 


Lactic Acid Level  3.4  *H 3.0  *H 














Test


  9/8/17


07:22 9/8/17


08:17 9/8/17


08:56 9/8/17


09:26


 


Lactic Acid Level 2.7  *H   


 


Bedside Glucose  68  L 80   101  














Test


  9/8/17


11:55 


  


  


 


 


Bedside Glucose 126     











Medications


Medications





 Current Medications


Ondansetron HCl (Zofran Inj) 4 mg Q6H  PRN IV NAUSEA AND/OR VOMITING;  Start 9/8 /17 at 01:30


Acetaminophen (Tylenol Tab) 650 mg Q6H  PRN PO PAIN LEVEL 1-3 OR FEVER;  Start 9 /8/17 at 01:30


Morphine Sulfate (morphine) 2 mg Q4H  PRN IV PAIN LEVEL 7-10;  Start 9/8/17 at 

01:30


Docusate Sodium (Colace) 100 mg Q12H  PRN PO CONSTIPATION;  Start 9/8/17 at 01:

30


Bisacodyl (Dulcolax) 5 mg DAILY  PRN PO CONSTIPATION;  Start 9/8/17 at 01:30


Famotidine (Pepcid Iv) 20 mg DAILY IV  Last administered on 9/8/17at 09:08; 

Admin Dose 20 MG;  Start 9/8/17 at 01:30


Diagnostic Test (Pha) (Accu-Chek) 1 ea 02 XX  Last administered on 9/8/17at 02:

00; Admin Dose 1 EA;  Start 9/8/17 at 02:00


Insulin Aspart (Novolog Insulin Pen) NOVOLOG *MILD* ALGORI... Q4 SC ;  Start 9/8 /17 at 05:00


Miscellaneous Information 1 ea NOTE XX ;  Start 9/8/17 at 01:30


Glucose (Glutose) 15 gm Q15M  PRN PO DECREASED GLUCOSE;  Start 9/8/17 at 01:30


Glucose (Glutose) 22.5 gm Q15M  PRN PO DECREASED GLUCOSE;  Start 9/8/17 at 01:30


Dextrose (D50w Syringe) 25 ml Q15M  PRN IV DECREASED GLUCOSE;  Start 9/8/17 at 

01:30


Dextrose (D50w Syringe) 50 ml Q15M  PRN IV DECREASED GLUCOSE;  Start 9/8/17 at 

01:30


Glucagon (Glucagen) 1 mg Q15M  PRN IM DECREASED GLUCOSE;  Start 9/8/17 at 01:30


Glucose 15 gm 15 gm Q15M  PRN BUCCAL DECREASED GLUCOSE;  Start 9/8/17 at 01:30


Sodium Chloride 1,000 ml @  75 mls/hr [id number] ONCE IV  Last administered on 9/8/ 17at 03:25; Admin Dose 75 MLS/HR;  Start 9/8/17 at 03:09;  Stop 9/8/17 at 16:28


Dextrose/Sodium Chloride 1,000 ml @  70 mls/hr [id number] IV ;  Start 9/8/17 at 13:

30


Ceftriaxone Sodium (Rocephin) 50 ml @  100 mls/hr Q24H IVPB ;  Start 9/8/17 at 

14:00











GODFREY PEREIRA MD Sep 8, 2017 12:21

## 2017-09-09 VITALS — SYSTOLIC BLOOD PRESSURE: 101 MMHG | RESPIRATION RATE: 20 BRPM | DIASTOLIC BLOOD PRESSURE: 58 MMHG

## 2017-09-09 VITALS — SYSTOLIC BLOOD PRESSURE: 121 MMHG | DIASTOLIC BLOOD PRESSURE: 59 MMHG | RESPIRATION RATE: 18 BRPM

## 2017-09-09 VITALS — DIASTOLIC BLOOD PRESSURE: 59 MMHG | RESPIRATION RATE: 18 BRPM | SYSTOLIC BLOOD PRESSURE: 127 MMHG

## 2017-09-09 VITALS — HEART RATE: 62 BPM

## 2017-09-09 VITALS — HEART RATE: 70 BPM

## 2017-09-09 VITALS — DIASTOLIC BLOOD PRESSURE: 58 MMHG | RESPIRATION RATE: 20 BRPM | SYSTOLIC BLOOD PRESSURE: 106 MMHG

## 2017-09-09 VITALS — DIASTOLIC BLOOD PRESSURE: 58 MMHG | RESPIRATION RATE: 18 BRPM | SYSTOLIC BLOOD PRESSURE: 117 MMHG

## 2017-09-09 VITALS — DIASTOLIC BLOOD PRESSURE: 59 MMHG | SYSTOLIC BLOOD PRESSURE: 106 MMHG | RESPIRATION RATE: 16 BRPM

## 2017-09-09 VITALS — HEART RATE: 64 BPM

## 2017-09-09 LAB
ALBUMIN SERPL-MCNC: 3.8 G/DL (ref 3.3–4.9)
ALBUMIN/GLOB SERPL: 1.02 {RATIO}
ALP SERPL-CCNC: 53 IU/L (ref 42–121)
ALT SERPL-CCNC: 32 IU/L (ref 13–69)
ANION GAP SERPL CALC-SCNC: 12 MMOL/L (ref 8–16)
AST SERPL-CCNC: 26 IU/L (ref 15–46)
BASOPHILS # BLD AUTO: 0 10^3/UL (ref 0–0.1)
BASOPHILS NFR BLD: 0.3 % (ref 0–2)
BILIRUB DIRECT SERPL-MCNC: 0 MG/DL (ref 0–0.2)
BILIRUB SERPL-MCNC: 0.2 MG/DL (ref 0.2–1.3)
BUN SERPL-MCNC: 14 MG/DL (ref 7–20)
CALCIUM SERPL-MCNC: 9 MG/DL (ref 8.4–10.2)
CHLORIDE SERPL-SCNC: 107 MMOL/L (ref 97–110)
CHOLEST SERPL-MCNC: 91 MG/DL (ref 100–200)
CHOLEST/HDLC SERPL: 3.9 RATIO
CO2 SERPL-SCNC: 26 MMOL/L (ref 21–31)
CREAT SERPL-MCNC: 0.8 MG/DL (ref 0.44–1)
EOSINOPHIL # BLD: 0.1 10^3/UL (ref 0–0.5)
EOSINOPHIL NFR BLD: 2 % (ref 0–7)
ERYTHROCYTE [DISTWIDTH] IN BLOOD BY AUTOMATED COUNT: 13.6 % (ref 11.5–14.5)
GLOBULIN SER-MCNC: 3.7 G/DL (ref 1.3–3.2)
GLUCOSE SERPL-MCNC: 189 MG/DL (ref 70–220)
HCT VFR BLD CALC: 41.2 % (ref 37–47)
HDLC SERPL-MCNC: 23 MG/DL (ref 33–92)
HGB BLD-MCNC: 13.4 G/DL (ref 12–16)
LYMPHOCYTES # BLD AUTO: 1.9 10^3/UL (ref 0.8–2.9)
LYMPHOCYTES NFR BLD AUTO: 31.4 % (ref 15–51)
MAGNESIUM SERPL-MCNC: 1.4 MG/DL (ref 1.7–2.5)
MCH RBC QN AUTO: 29.8 PG (ref 29–33)
MCHC RBC AUTO-ENTMCNC: 32.5 G/DL (ref 32–37)
MCV RBC AUTO: 91.8 FL (ref 82–101)
MONOCYTES # BLD: 0.3 10^3/UL (ref 0.3–0.9)
MONOCYTES NFR BLD: 5.2 % (ref 0–11)
NEUTROPHILS NFR BLD AUTO: 60.8 % (ref 39–77)
NRBC # BLD MANUAL: 0 10^3/UL (ref 0–0)
NRBC BLD AUTO-RTO: 0 /100WBC (ref 0–0)
PLATELET # BLD: 285 10^3/UL (ref 140–415)
PMV BLD AUTO: 9.3 FL (ref 7.4–10.4)
POTASSIUM SERPL-SCNC: 4.1 MMOL/L (ref 3.5–5.1)
PROT SERPL-MCNC: 7.5 G/DL (ref 6.1–8.1)
RBC # BLD AUTO: 4.49 10^6/UL (ref 4.2–5.4)
SODIUM SERPL-SCNC: 141 MMOL/L (ref 135–144)
TRIGL SERPL-MCNC: 87 MG/DL (ref 0–149)
TSH SERPL-ACNC: 3.62 MIU/L (ref 0.47–4.68)
WBC # BLD AUTO: 5.9 10^3/UL (ref 4.8–10.8)

## 2017-09-09 RX ADMIN — POTASSIUM CHLORIDE SCH MLS/HR: 2 INJECTION, SOLUTION, CONCENTRATE INTRAVENOUS at 03:45

## 2017-09-09 RX ADMIN — POTASSIUM CHLORIDE SCH MLS/HR: 2 INJECTION, SOLUTION, CONCENTRATE INTRAVENOUS at 05:08

## 2017-09-09 RX ADMIN — DIPHENHYDRAMINE HYDROCHLORIDE SCH MG: 50 INJECTION, SOLUTION INTRAMUSCULAR; INTRAVENOUS at 08:34

## 2017-09-09 RX ADMIN — CEFTRIAXONE SCH MLS/HR: 1 INJECTION, SOLUTION INTRAVENOUS at 14:02

## 2017-09-09 NOTE — PN
Date/Time of Note


Date/Time of Note


DATE: 9/9/17 


TIME: 15:21





Assessment/Plan


VTE Prophylaxis


VTE Prophylaxis Intervention:  SCD's





Lines/Catheters


IV Catheter Type (from Nrsg):  Peripheral IV





Assessment/Plan


Assessment/Plan


78 yo F admitted for symptomatic hypoglycemia of iatrogenic origin


a1c is <7, which is well within goal given pt's advanced age. Unclear what PCP 

has as target but anything under 8 seems reasonable given her advanced age


hold all home DM meds, stop d5


urine culture with low CFU count not consistent with infection





will dc in AM if continues to feel well


will dc tele





Subjective


24 Hr Interval Summary


Free Text/Dictation


Pt feeling much better. Eating well





Exam/Review of Systems


Vital Signs


Vitals





 Vital Signs








  Date Time  Temp Pulse Resp B/P Pulse Ox O2 Delivery O2 Flow Rate FiO2


 


9/9/17 12:17  70      


 


9/9/17 11:55 98.0  18 127/59 98   


 


9/8/17 00:13      Room Air  














 Intake and Output   


 


 9/8/17 9/8/17 9/9/17





 15:00 23:00 07:00


 


Intake Total 1050 ml 1960 ml 910 ml


 


Balance 1050 ml 1960 ml 910 ml











Exam


nad


no mrg


lungs clear


abd soft


no rashes





urine culture with <10k CFUs GNRs





Results


Result Diagram:  


9/9/17 0801                                                                    

            9/9/17 0802





Results 24 hrs





Laboratory Tests








Test


  9/8/17


17:47 9/8/17


18:57 9/8/17


20:56 9/9/17


01:02


 


Bedside Glucose 168    219   151  


 


Creatine Kinase  32    


 


Creatine Kinase Index  2.2    


 


Creatinine Kinase MB (Mass)  0.71    


 


Troponin I  < 0.012    














Test


  9/9/17


05:00 9/9/17


08:01 9/9/17


08:02 9/9/17


08:32


 


Bedside Glucose 167     187  


 


White Blood Count  5.9  #  


 


Red Blood Count  4.49    


 


Hemoglobin  13.4    


 


Hematocrit  41.2    


 


Mean Corpuscular Volume  91.8    


 


Mean Corpuscular Hemoglobin  29.8    


 


Mean Corpuscular Hemoglobin


Concent 


  32.5  


  


  


 


 


Red Cell Distribution Width  13.6    


 


Platelet Count  285    


 


Mean Platelet Volume  9.3    


 


Neutrophils %  60.8    


 


Lymphocytes %  31.4    


 


Monocytes %  5.2    


 


Eosinophils %  2.0    


 


Basophils %  0.3    


 


Nucleated Red Blood Cells %  0.0    


 


Neutrophils # (Manual)  3.6    


 


Lymphocytes #  1.9    


 


Monocytes #  0.3    


 


Eosinophils #  0.1    


 


Basophils #  0.0    


 


Nucleated Red Blood Cells #  0.0    


 


Hemoglobin A1c  6.5  H  


 


Sodium Level   141   


 


Potassium Level   4.1   


 


Chloride Level   107   


 


Carbon Dioxide Level   26   


 


Anion Gap   12   


 


Blood Urea Nitrogen   14  # 


 


Creatinine   0.80   


 


Glucose Level   189   


 


Calcium Level   9.0   


 


Magnesium Level   1.4  L 


 


Total Bilirubin   0.2   


 


Direct Bilirubin   0.00   


 


Indirect Bilirubin   0.2   


 


Aspartate Amino Transf


(AST/SGOT) 


  


  26  


  


 


 


Alanine Aminotransferase


(ALT/SGPT) 


  


  32  


  


 


 


Alkaline Phosphatase   53   


 


Total Protein   7.5   


 


Albumin   3.8   


 


Globulin   3.70  H 


 


Albumin/Globulin Ratio   1.02   


 


Triglycerides Level   87   


 


Cholesterol Level   91  L 


 


LDL Cholesterol, Calculated   51   


 


HDL Cholesterol   23  L 


 


Cholesterol/HDL Ratio   3.9   


 


Thyroid Stimulating Hormone


(TSH) 


  


  3.620  


  


 














Test


  9/9/17


11:56 


  


  


 


 


Bedside Glucose 173     











Medications


Medications





 Current Medications


Ondansetron HCl (Zofran Inj) 4 mg Q6H  PRN IV NAUSEA AND/OR VOMITING;  Start 9/8 /17 at 01:30


Acetaminophen (Tylenol Tab) 650 mg Q6H  PRN PO PAIN LEVEL 1-3 OR FEVER Last 

administered on 9/8/17at 17:42; Admin Dose 650 MG;  Start 9/8/17 at 01:30


Morphine Sulfate (morphine) 2 mg Q4H  PRN IV PAIN LEVEL 7-10;  Start 9/8/17 at 

01:30


Docusate Sodium (Colace) 100 mg Q12H  PRN PO CONSTIPATION;  Start 9/8/17 at 01:

30


Bisacodyl (Dulcolax) 5 mg DAILY  PRN PO CONSTIPATION;  Start 9/8/17 at 01:30


Diagnostic Test (Pha) (Accu-Chek) 1 ea 02 XX  Last administered on 9/8/17at 02:

00; Admin Dose 1 EA;  Start 9/8/17 at 02:00


Insulin Aspart (Novolog Insulin Pen) NOVOLOG *MILD* ALGORI... Q4 SC  Last 

administered on 9/9/17at 12:03; Admin Dose 1 UNIT;  Start 9/8/17 at 05:00


Miscellaneous Information 1 ea NOTE XX ;  Start 9/8/17 at 01:30


Glucose (Glutose) 15 gm Q15M  PRN PO DECREASED GLUCOSE;  Start 9/8/17 at 01:30


Glucose (Glutose) 22.5 gm Q15M  PRN PO DECREASED GLUCOSE;  Start 9/8/17 at 01:30


Dextrose (D50w Syringe) 25 ml Q15M  PRN IV DECREASED GLUCOSE;  Start 9/8/17 at 

01:30


Dextrose (D50w Syringe) 50 ml Q15M  PRN IV DECREASED GLUCOSE;  Start 9/8/17 at 

01:30


Glucagon (Glucagen) 1 mg Q15M  PRN IM DECREASED GLUCOSE;  Start 9/8/17 at 01:30


Glucose 15 gm 15 gm Q15M  PRN BUCCAL DECREASED GLUCOSE;  Start 9/8/17 at 01:30


Ceftriaxone Sodium (Rocephin) 50 ml @  100 mls/hr Q24H IVPB  Last administered 

on 9/9/17at 14:02; Admin Dose 100 MLS/HR;  Start 9/8/17 at 14:00











RADHA GARCIA MD Sep 9, 2017 15:25

## 2017-09-10 VITALS — SYSTOLIC BLOOD PRESSURE: 114 MMHG | DIASTOLIC BLOOD PRESSURE: 65 MMHG | RESPIRATION RATE: 18 BRPM

## 2017-09-10 VITALS — RESPIRATION RATE: 20 BRPM | DIASTOLIC BLOOD PRESSURE: 61 MMHG | SYSTOLIC BLOOD PRESSURE: 122 MMHG

## 2017-09-10 NOTE — DS
Date/Time of Note


Date/Time of Note


DATE: 9/10/17 


TIME: 09:09





Discharge Summary


Admission/Discharge Info


Admit Date/Time


Sep 7, 2017 at 22:47


Discharge Date/Time





Discharge Diagnosis


iatrogenic hypoglycemia


Patient Condition:  Stable


Procedures


a1c 6.5


urine culture <10k CFU GNRs


All BPs <140/90


Hx of Present Illness


Chief complaint: Vomiting 2-3 days





Patient is a 77-year-old female recently discharged from the hospital with UTI 

and on antibiotics who presents to the ER with gradual onset, intermittent, 

moderate nonbloody nonbilious vomiting for 2-3 days.  Her family reports that 

she has not been eating well since she left the hospital and has had 

constipation.  She has had numerous episodes of vomiting in the last 2-3 days.  

She denied fever headache and abdominal pain in the ED.  However upon my 

examination patient did report that she had some abdominal pain.  Patient is 

also poor historian





Allergies: NKDA





Medications: See MAR


Hospital Course


Pt admitted for symptomatic hypoglycemia, initially required dextrose infusion. 

During last 24 hours of her hospital stay, pt was on no fluids and only 

prandial SSI. BGs all <225. Also home BP meds held at admission and BP remained 

<130/70. There was concern pt might have had cystitis, but urine culture with 

low CFU count thus no empiric evidence of UTI.


Pt discharged home, advised to hold off on taking any BP or DM meds until seen 

by PCP.


Given a1c is 6.5, suspect control is too tight at home. Given pt's advanced age

, ADA advises a1c<7.5.


BP goal given pt's age <140/90 which she's remained at here in the hospital on 

no medications.





Furthermore, urine culture from pt's ER visit 8.31 was <10k CFUs mixed GPOs 

consistent with contaminant. Unlikely this patient ever had cystitis in the 

first place.


Home Meds


Reported Medications


Metoprolol Succinate* (Toprol XL*) 25 Mg Tab.sr.24h, 25 MG PO DAILY, #30 TAB


   9/7/17


[Basaglar 100/Ml ]   No Conflict Check, 14 UNITS SQ DAILY


   KWIKPEN


   9/7/17


Atorvastatin Calcium (Atorvastatin Calcium) 10 Mg Tablet, 10 MG PO QHS, #30 TAB


   8/31/17


Aspirin* (Aspirin* EC) 81 Mg Tablet.dr, 81 MG PO DAILY, TAB


   8/31/17


Omeprazole* (Prilosec*) 20 Mg Capsule.dr, 20 MG PO DAILY for AS NEEDED, CAP


   11/17/14


Discontinued Reported Medications


Pioglitazone Hcl* (Pioglitazone Hcl*) 15 Mg Tablet, 15 MG PO DAILY, TAB


   9/7/17


Glipizide* (Glipizide*) 5 Mg Tablet, 5 MG PO AC BREAKFAST DINNER, TAB


   9/7/17


Metformin Hcl* (Metformin Hcl*) 1,000 Mg Tablet, 1000 MG PO WITH BREAKFAST DINNE

, #60 TAB


   8/31/17


Insulin Glargine* (Lantus*) 100 Unit/Ml Soln, 14 UNIT SC DAILY, #1 VIAL


   7/27/17


Glipizide* (Glipizide*) 10 Mg Tablet, 10 MG PO AC BREAKFAST BEDTIME, TAB


   2/14/15


Pioglitazone Hcl* (Actos*) 45 Mg Tablet, 45 MG PO DAILY, TAB


   11/17/14


Discontinued Scripts


Ondansetron (Zofran Odt) 4 Mg Tab.rapdis, 4 MG PO Q6, #10


   Prov:ABRAHAM JOYCE DO         8/31/17


Levofloxacin* (Levaquin*) 500 Mg Tablet, 500 MG PO DAILY for 7 Days, TAB


   Prov:ABRAHAM JOYCE DO         8/31/17


Follow-up Plan


PCP within 5 days


copy of dc summary given to pt's daughter prior to discharge


Primary Care Provider


Tito Tran


Time spent on discharge:   > 30 minutes


Pending Labs





Laboratory Tests








Test


  9/9/17


11:56 9/9/17


16:58 9/9/17


20:10 9/10/17


08:30


 


Bedside Glucose


  173mg/dL


() 223mg/dL


() 170mg/dL


() 173mg/dL


()

















RADHA GARCIA MD Sep 10, 2017 09:15

## 2017-09-10 NOTE — PDOCDIS
Discharge Instructions


CONDITION


Patient Condition:  Stable





HOME CARE INSTRUCTIONS:


Diet Instructions:  Special Diet:  diabetic/carb controlled





FOLLOW UP/APPOINTMENTS


Follow-up Plan


Follow up with your regular doctor this week.


DO NOT TAKE ANY INSULIN UNTIL YOU SEE YOUR REGULAR DOCTOR.


Check your blood sugars twice daily and bring the results to your regular doctor





Also your blood pressure has been well controlled on no blood pressure 

medication.


Please hold off on taking any blood pressure medication until you see your 

regular doctor.











RADHA GARCIA MD Sep 10, 2017 09:09

## 2018-04-25 ENCOUNTER — HOSPITAL ENCOUNTER (EMERGENCY)
Age: 78
LOS: 1 days | Discharge: HOME | End: 2018-04-26

## 2018-04-25 ENCOUNTER — HOSPITAL ENCOUNTER (EMERGENCY)
Dept: HOSPITAL 91 - E/R | Age: 78
LOS: 1 days | Discharge: HOME | End: 2018-04-26
Payer: COMMERCIAL

## 2018-04-25 DIAGNOSIS — I10: ICD-10-CM

## 2018-04-25 DIAGNOSIS — Z79.82: ICD-10-CM

## 2018-04-25 DIAGNOSIS — E11.9: ICD-10-CM

## 2018-04-25 DIAGNOSIS — R65.21: Primary | ICD-10-CM

## 2018-04-25 PROCEDURE — 85730 THROMBOPLASTIN TIME PARTIAL: CPT

## 2018-04-25 PROCEDURE — 80053 COMPREHEN METABOLIC PANEL: CPT

## 2018-04-25 PROCEDURE — 87040 BLOOD CULTURE FOR BACTERIA: CPT

## 2018-04-25 PROCEDURE — 85610 PROTHROMBIN TIME: CPT

## 2018-04-25 PROCEDURE — 99291 CRITICAL CARE FIRST HOUR: CPT

## 2018-04-25 PROCEDURE — 93005 ELECTROCARDIOGRAM TRACING: CPT

## 2018-04-25 PROCEDURE — 87086 URINE CULTURE/COLONY COUNT: CPT

## 2018-04-25 PROCEDURE — 96374 THER/PROPH/DIAG INJ IV PUSH: CPT

## 2018-04-25 PROCEDURE — 84484 ASSAY OF TROPONIN QUANT: CPT

## 2018-04-25 PROCEDURE — 83605 ASSAY OF LACTIC ACID: CPT

## 2018-04-25 PROCEDURE — 70450 CT HEAD/BRAIN W/O DYE: CPT

## 2018-04-25 PROCEDURE — 85025 COMPLETE CBC W/AUTO DIFF WBC: CPT

## 2018-04-25 PROCEDURE — 71045 X-RAY EXAM CHEST 1 VIEW: CPT

## 2018-04-25 PROCEDURE — 81003 URINALYSIS AUTO W/O SCOPE: CPT

## 2018-04-25 PROCEDURE — 36415 COLL VENOUS BLD VENIPUNCTURE: CPT

## 2018-04-25 PROCEDURE — 82962 GLUCOSE BLOOD TEST: CPT

## 2018-04-25 PROCEDURE — 96375 TX/PRO/DX INJ NEW DRUG ADDON: CPT

## 2018-04-26 LAB
ADD MAN DIFF?: NO
ALANINE AMINOTRANSFERASE: 23 IU/L (ref 13–69)
ALBUMIN/GLOBULIN RATIO: 1.17
ALBUMIN: 4 G/DL (ref 3.3–4.9)
ALKALINE PHOSPHATASE: 45 IU/L (ref 42–121)
ANION GAP: 18 (ref 8–16)
ASPARTATE AMINO TRANSFERASE: 26 IU/L (ref 15–46)
BASOPHIL #: 0 10^3/UL (ref 0–0.1)
BASOPHILS %: 0.5 % (ref 0–2)
BILIRUBIN,DIRECT: 0 MG/DL (ref 0–0.2)
BILIRUBIN,TOTAL: 0.4 MG/DL (ref 0.2–1.3)
BLOOD UREA NITROGEN: 19 MG/DL (ref 7–20)
CALCIUM: 9.5 MG/DL (ref 8.4–10.2)
CARBON DIOXIDE: 28 MMOL/L (ref 21–31)
CHLORIDE: 103 MMOL/L (ref 97–110)
CREATININE: 0.94 MG/DL (ref 0.44–1)
EOSINOPHILS #: 0.1 10^3/UL (ref 0–0.5)
EOSINOPHILS %: 1.2 % (ref 0–7)
GLOBULIN: 3.4 G/DL (ref 1.3–3.2)
GLUCOSE: 143 MG/DL (ref 70–220)
HEMATOCRIT: 37.7 % (ref 37–47)
HEMOGLOBIN: 12.4 G/DL (ref 12–16)
INR: 0.85
LACTIC ACID: 1.8 MMOL/L (ref 0.5–2)
LACTIC ACID: 2.5 MMOL/L (ref 0.5–2)
LACTIC ACID: 4.6 MMOL/L (ref 0.5–2)
LYMPHOCYTES #: 2.4 10^3/UL (ref 0.8–2.9)
LYMPHOCYTES %: 37.6 % (ref 15–51)
MEAN CORPUSCULAR HEMOGLOBIN: 30.5 PG (ref 29–33)
MEAN CORPUSCULAR HGB CONC: 32.9 G/DL (ref 32–37)
MEAN CORPUSCULAR VOLUME: 92.6 FL (ref 82–101)
MEAN PLATELET VOLUME: 9.6 FL (ref 7.4–10.4)
MONOCYTE #: 0.5 10^3/UL (ref 0.3–0.9)
MONOCYTES %: 8.3 % (ref 0–11)
NEUTROPHIL #: 3.4 10^3/UL (ref 1.6–7.5)
NEUTROPHILS %: 52.2 % (ref 39–77)
NUCLEATED RED BLOOD CELLS #: 0 10^3/UL (ref 0–0)
NUCLEATED RED BLOOD CELLS%: 0 /100WBC (ref 0–0)
PARTIAL THROMBOPLASTIN TIME: 32.3 SEC (ref 25–35)
PLATELET COUNT: 241 10^3/UL (ref 140–415)
POTASSIUM: 4.5 MMOL/L (ref 3.5–5.1)
PROTIME: 11.7 SEC (ref 11.9–14.9)
PT RATIO: 0.9
RED BLOOD COUNT: 4.07 10^6/UL (ref 4.2–5.4)
RED CELL DISTRIBUTION WIDTH: 13 % (ref 11.5–14.5)
SODIUM: 144 MMOL/L (ref 135–144)
TOTAL PROTEIN: 7.4 G/DL (ref 6.1–8.1)
TROPONIN-I: < 0.012 NG/ML (ref 0–0.12)
URINE PH (DIP) POC: 6.5 (ref 5–8.5)
WHITE BLOOD COUNT: 6.5 10^3/UL (ref 4.8–10.8)

## 2018-04-26 RX ADMIN — INSULIN GLARGINE 1 UNIT: 100 INJECTION, SOLUTION SUBCUTANEOUS at 09:00

## 2018-04-26 RX ADMIN — VANCOMYCIN HYDROCHLORIDE 1 MLS/HR: 1 INJECTION, POWDER, LYOPHILIZED, FOR SOLUTION INTRAVENOUS at 03:02

## 2018-04-26 RX ADMIN — PIPERACILLIN SODIUM AND TAZOBACTAM SODIUM 1 MLS/HR: 3; .375 INJECTION, POWDER, LYOPHILIZED, FOR SOLUTION INTRAVENOUS at 03:02

## 2018-04-26 RX ADMIN — THIAMINE HYDROCHLORIDE 1 MLS/HR: 100 INJECTION, SOLUTION INTRAMUSCULAR; INTRAVENOUS at 03:02

## 2018-04-26 RX ADMIN — METOPROLOL SUCCINATE 1 MG: 25 TABLET, EXTENDED RELEASE ORAL at 09:36

## 2018-05-27 ENCOUNTER — HOSPITAL ENCOUNTER (EMERGENCY)
Dept: HOSPITAL 91 - E/R | Age: 78
LOS: 1 days | Discharge: HOME | End: 2018-05-28
Payer: COMMERCIAL

## 2018-05-27 ENCOUNTER — HOSPITAL ENCOUNTER (EMERGENCY)
Age: 78
LOS: 1 days | Discharge: HOME | End: 2018-05-28

## 2018-05-27 DIAGNOSIS — I10: ICD-10-CM

## 2018-05-27 DIAGNOSIS — R10.9: ICD-10-CM

## 2018-05-27 DIAGNOSIS — N39.0: ICD-10-CM

## 2018-05-27 DIAGNOSIS — E11.9: ICD-10-CM

## 2018-05-27 DIAGNOSIS — Z79.84: ICD-10-CM

## 2018-05-27 DIAGNOSIS — E86.0: Primary | ICD-10-CM

## 2018-05-27 LAB
ADD MAN DIFF?: NO
ADD UMIC: NO
ALANINE AMINOTRANSFERASE: 28 IU/L (ref 13–69)
ALBUMIN/GLOBULIN RATIO: 1.11
ALBUMIN: 3.8 G/DL (ref 3.3–4.9)
ALKALINE PHOSPHATASE: 49 IU/L (ref 42–121)
ANION GAP: 16 (ref 8–16)
ASPARTATE AMINO TRANSFERASE: 31 IU/L (ref 15–46)
BASOPHIL #: 0 10^3/UL (ref 0–0.1)
BASOPHILS %: 0.2 % (ref 0–2)
BILIRUBIN,DIRECT: 0 MG/DL (ref 0–0.2)
BILIRUBIN,TOTAL: 0.6 MG/DL (ref 0.2–1.3)
BLOOD UREA NITROGEN: 18 MG/DL (ref 7–20)
CALCIUM: 9.9 MG/DL (ref 8.4–10.2)
CARBON DIOXIDE: 27 MMOL/L (ref 21–31)
CHLORIDE: 102 MMOL/L (ref 97–110)
CK INDEX: 1.5
CK-MB: 0.34 NG/ML (ref 0–2.4)
CREATINE KINASE: 23 IU/L (ref 23–200)
CREATININE: 1 MG/DL (ref 0.44–1)
EOSINOPHILS #: 0.1 10^3/UL (ref 0–0.5)
EOSINOPHILS %: 0.6 % (ref 0–7)
FREE THYROXINE INDEX (CALC): 3.26 UG/ML (ref 0.65–3.89)
GLOBULIN: 3.4 G/DL (ref 1.3–3.2)
GLUCOSE: 179 MG/DL (ref 70–220)
HEMATOCRIT: 37.1 % (ref 37–47)
HEMOGLOBIN: 12.1 G/DL (ref 12–16)
INR: 0.86
LYMPHOCYTES #: 1.7 10^3/UL (ref 0.8–2.9)
LYMPHOCYTES %: 19.5 % (ref 15–51)
MEAN CORPUSCULAR HEMOGLOBIN: 29.5 PG (ref 29–33)
MEAN CORPUSCULAR HGB CONC: 32.6 G/DL (ref 32–37)
MEAN CORPUSCULAR VOLUME: 90.5 FL (ref 82–101)
MEAN PLATELET VOLUME: 10 FL (ref 7.4–10.4)
MONOCYTE #: 0.8 10^3/UL (ref 0.3–0.9)
MONOCYTES %: 8.6 % (ref 0–11)
NEUTROPHIL #: 6.3 10^3/UL (ref 1.6–7.5)
NEUTROPHILS %: 70.8 % (ref 39–77)
NUCLEATED RED BLOOD CELLS #: 0 10^3/UL (ref 0–0)
NUCLEATED RED BLOOD CELLS%: 0 /100WBC (ref 0–0)
PARTIAL THROMBOPLASTIN TIME: 33.5 SEC (ref 25–35)
PLATELET COUNT: 239 10^3/UL (ref 140–415)
POTASSIUM: 4.1 MMOL/L (ref 3.5–5.1)
PROTIME: 11.8 SEC (ref 11.9–14.9)
PT RATIO: 0.9
RED BLOOD COUNT: 4.1 10^6/UL (ref 4.2–5.4)
RED CELL DISTRIBUTION WIDTH: 12.7 % (ref 11.5–14.5)
SODIUM: 141 MMOL/L (ref 135–144)
T3 UPTAKE: 35.8 % (ref 23.5–40.5)
T4 (THYROXINE): 9.1 UG/DL (ref 5.5–11)
TOTAL PROTEIN: 7.2 G/DL (ref 6.1–8.1)
TROPONIN-I: < 0.012 NG/ML (ref 0–0.12)
UR ASCORBIC ACID: NEGATIVE MG/DL
UR BILIRUBIN (DIP): NEGATIVE MG/DL
UR BLOOD (DIP): NEGATIVE MG/DL
UR CLARITY: CLEAR
UR COLOR: YELLOW
UR GLUCOSE (DIP): (no result) MG/DL
UR KETONES (DIP): NEGATIVE MG/DL
UR LEUKOCYTE ESTERASE (DIP): NEGATIVE LEU/UL
UR NITRITE (DIP): NEGATIVE MG/DL
UR PH (DIP): 6 (ref 5–9)
UR SPECIFIC GRAVITY (DIP): 1.01 (ref 1–1.03)
UR TOTAL PROTEIN (DIP): NEGATIVE MG/DL
UR UROBILINOGEN (DIP): NEGATIVE MG/DL
WHITE BLOOD COUNT: 8.9 10^3/UL (ref 4.8–10.8)

## 2018-05-27 PROCEDURE — 85025 COMPLETE CBC W/AUTO DIFF WBC: CPT

## 2018-05-27 PROCEDURE — 71045 X-RAY EXAM CHEST 1 VIEW: CPT

## 2018-05-27 PROCEDURE — 84484 ASSAY OF TROPONIN QUANT: CPT

## 2018-05-27 PROCEDURE — 99285 EMERGENCY DEPT VISIT HI MDM: CPT

## 2018-05-27 PROCEDURE — 84479 ASSAY OF THYROID (T3 OR T4): CPT

## 2018-05-27 PROCEDURE — 81003 URINALYSIS AUTO W/O SCOPE: CPT

## 2018-05-27 PROCEDURE — 84436 ASSAY OF TOTAL THYROXINE: CPT

## 2018-05-27 PROCEDURE — 87086 URINE CULTURE/COLONY COUNT: CPT

## 2018-05-27 PROCEDURE — 36415 COLL VENOUS BLD VENIPUNCTURE: CPT

## 2018-05-27 PROCEDURE — 85730 THROMBOPLASTIN TIME PARTIAL: CPT

## 2018-05-27 PROCEDURE — 74176 CT ABD & PELVIS W/O CONTRAST: CPT

## 2018-05-27 PROCEDURE — 80053 COMPREHEN METABOLIC PANEL: CPT

## 2018-05-27 PROCEDURE — 96374 THER/PROPH/DIAG INJ IV PUSH: CPT

## 2018-05-27 PROCEDURE — 82553 CREATINE MB FRACTION: CPT

## 2018-05-27 PROCEDURE — 82550 ASSAY OF CK (CPK): CPT

## 2018-05-27 PROCEDURE — 85610 PROTHROMBIN TIME: CPT

## 2018-05-27 PROCEDURE — 70450 CT HEAD/BRAIN W/O DYE: CPT

## 2018-05-27 RX ADMIN — ONDANSETRON HYDROCHLORIDE 1 MG: 2 INJECTION, SOLUTION INTRAMUSCULAR; INTRAVENOUS at 22:57

## 2018-05-27 RX ADMIN — THIAMINE HYDROCHLORIDE 1 MLS/HR: 100 INJECTION, SOLUTION INTRAMUSCULAR; INTRAVENOUS at 22:57

## 2018-05-27 RX ADMIN — MECLIZINE 1 MG: 12.5 TABLET ORAL at 22:57

## 2018-05-28 RX ADMIN — CEPHALEXIN 1 MG: 500 CAPSULE ORAL at 02:55

## 2019-02-28 ENCOUNTER — HOSPITAL ENCOUNTER (INPATIENT)
Dept: HOSPITAL 10 - E/R | Age: 79
LOS: 3 days | Discharge: HOME HEALTH | DRG: 872 | End: 2019-03-03
Attending: HOSPITALIST | Admitting: HOSPITALIST
Payer: COMMERCIAL

## 2019-02-28 ENCOUNTER — HOSPITAL ENCOUNTER (INPATIENT)
Dept: HOSPITAL 91 - E/R | Age: 79
LOS: 3 days | Discharge: HOME HEALTH | DRG: 872 | End: 2019-03-03
Payer: COMMERCIAL

## 2019-02-28 VITALS — DIASTOLIC BLOOD PRESSURE: 64 MMHG | SYSTOLIC BLOOD PRESSURE: 119 MMHG | HEART RATE: 86 BPM | RESPIRATION RATE: 16 BRPM

## 2019-02-28 VITALS — DIASTOLIC BLOOD PRESSURE: 56 MMHG | HEART RATE: 85 BPM | RESPIRATION RATE: 18 BRPM | SYSTOLIC BLOOD PRESSURE: 114 MMHG

## 2019-02-28 VITALS
BODY MASS INDEX: 21.86 KG/M2 | HEIGHT: 60 IN | WEIGHT: 111.33 LBS | BODY MASS INDEX: 21.86 KG/M2 | WEIGHT: 111.33 LBS | HEIGHT: 60 IN

## 2019-02-28 DIAGNOSIS — A41.9: Primary | ICD-10-CM

## 2019-02-28 DIAGNOSIS — E78.00: ICD-10-CM

## 2019-02-28 DIAGNOSIS — Z79.4: ICD-10-CM

## 2019-02-28 DIAGNOSIS — Z90.49: ICD-10-CM

## 2019-02-28 DIAGNOSIS — E11.9: ICD-10-CM

## 2019-02-28 DIAGNOSIS — I10: ICD-10-CM

## 2019-02-28 DIAGNOSIS — N39.0: ICD-10-CM

## 2019-02-28 LAB
ADD MAN DIFF?: NO
ADD UMIC: YES
ALANINE AMINOTRANSFERASE: 12 IU/L (ref 13–69)
ALBUMIN/GLOBULIN RATIO: 1.1
ALBUMIN: 4.3 G/DL (ref 3.3–4.9)
ALKALINE PHOSPHATASE: 55 IU/L (ref 42–121)
ANION GAP: 11 (ref 5–13)
ASPARTATE AMINO TRANSFERASE: 24 IU/L (ref 15–46)
BASOPHIL #: 0 10^3/UL (ref 0–0.1)
BASOPHILS %: 0.1 % (ref 0–2)
BILIRUBIN,DIRECT: 0 MG/DL (ref 0–0.2)
BILIRUBIN,TOTAL: 0.7 MG/DL (ref 0.2–1.3)
BLOOD UREA NITROGEN: 17 MG/DL (ref 7–20)
CALCIUM: 9.4 MG/DL (ref 8.4–10.2)
CARBON DIOXIDE: 23 MMOL/L (ref 21–31)
CHLORIDE: 105 MMOL/L (ref 97–110)
CREATININE: 0.95 MG/DL (ref 0.44–1)
EOSINOPHILS #: 0 10^3/UL (ref 0–0.5)
EOSINOPHILS %: 0 % (ref 0–7)
FREE T4 (FREE THYROXINE): 1.56 NG/DL (ref 0.78–2.44)
GLOBULIN: 3.9 G/DL (ref 1.3–3.2)
GLUCOSE: 274 MG/DL (ref 70–220)
HEMATOCRIT: 40.8 % (ref 37–47)
HEMOGLOBIN: 13.3 G/DL (ref 12–16)
IMMATURE GRANS #M: 0.03 10^3/UL (ref 0–0.03)
IMMATURE GRANS % (M): 0.3 % (ref 0–0.43)
INR: 1.01
LACTIC ACID: 0.8 MMOL/L (ref 0.5–2)
LACTIC ACID: 1.1 MMOL/L (ref 0.5–2)
LIPASE: 78 U/L (ref 23–300)
LYMPHOCYTES #: 1.8 10^3/UL (ref 0.8–2.9)
LYMPHOCYTES %: 16.3 % (ref 15–51)
MEAN CORPUSCULAR HEMOGLOBIN: 30.2 PG (ref 29–33)
MEAN CORPUSCULAR HGB CONC: 32.6 G/DL (ref 32–37)
MEAN CORPUSCULAR VOLUME: 92.7 FL (ref 82–101)
MEAN PLATELET VOLUME: 9.4 FL (ref 7.4–10.4)
MONOCYTE #: 0.9 10^3/UL (ref 0.3–0.9)
MONOCYTES %: 7.8 % (ref 0–11)
NEUTROPHIL #: 8.2 10^3/UL (ref 1.6–7.5)
NEUTROPHILS %: 75.5 % (ref 39–77)
NUCLEATED RED BLOOD CELLS #: 0 10^3/UL (ref 0–0)
NUCLEATED RED BLOOD CELLS%: 0 /100WBC (ref 0–0)
PARTIAL THROMBOPLASTIN TIME: 32.6 SEC (ref 23–35)
PLATELET COUNT: 239 10^3/UL (ref 140–415)
POTASSIUM: 4.5 MMOL/L (ref 3.5–5.1)
PROTIME: 13.4 SEC (ref 11.9–14.9)
PT RATIO: 1
RED BLOOD COUNT: 4.4 10^6/UL (ref 4.2–5.4)
RED CELL DISTRIBUTION WIDTH: 13.7 % (ref 11.5–14.5)
SODIUM: 139 MMOL/L (ref 135–144)
TOTAL PROTEIN: 8.2 G/DL (ref 6.1–8.1)
TROPONIN-I: < 0.012 NG/ML (ref 0–0.12)
UR ASCORBIC ACID: NEGATIVE MG/DL
UR BACTERIA: (no result) /HPF
UR BILIRUBIN (DIP): NEGATIVE MG/DL
UR BLOOD (DIP): (no result) MG/DL
UR CLARITY: (no result)
UR COLOR: YELLOW
UR GLUCOSE (DIP): (no result) MG/DL
UR KETONES (DIP): (no result) MG/DL
UR LEUKOCYTE ESTERASE (DIP): NEGATIVE LEU/UL
UR MUCUS: (no result) /HPF
UR NITRITE (DIP): POSITIVE MG/DL
UR PH (DIP): 6 (ref 5–9)
UR RBC: 8 /HPF (ref 0–5)
UR SPECIFIC GRAVITY (DIP): 1.02 (ref 1–1.03)
UR TOTAL PROTEIN (DIP): (no result) MG/DL
UR UROBILINOGEN (DIP): (no result) MG/DL
UR WBC: 9 /HPF (ref 0–5)
WHITE BLOOD COUNT: 10.9 10^3/UL (ref 4.8–10.8)

## 2019-02-28 PROCEDURE — 87086 URINE CULTURE/COLONY COUNT: CPT

## 2019-02-28 PROCEDURE — 99291 CRITICAL CARE FIRST HOUR: CPT

## 2019-02-28 PROCEDURE — 36415 COLL VENOUS BLD VENIPUNCTURE: CPT

## 2019-02-28 PROCEDURE — 93005 ELECTROCARDIOGRAM TRACING: CPT

## 2019-02-28 PROCEDURE — 87400 INFLUENZA A/B EACH AG IA: CPT

## 2019-02-28 PROCEDURE — 83036 HEMOGLOBIN GLYCOSYLATED A1C: CPT

## 2019-02-28 PROCEDURE — 96365 THER/PROPH/DIAG IV INF INIT: CPT

## 2019-02-28 PROCEDURE — 83690 ASSAY OF LIPASE: CPT

## 2019-02-28 PROCEDURE — 84484 ASSAY OF TROPONIN QUANT: CPT

## 2019-02-28 PROCEDURE — 81001 URINALYSIS AUTO W/SCOPE: CPT

## 2019-02-28 PROCEDURE — 70450 CT HEAD/BRAIN W/O DYE: CPT

## 2019-02-28 PROCEDURE — 83735 ASSAY OF MAGNESIUM: CPT

## 2019-02-28 PROCEDURE — 96367 TX/PROPH/DG ADDL SEQ IV INF: CPT

## 2019-02-28 PROCEDURE — 71045 X-RAY EXAM CHEST 1 VIEW: CPT

## 2019-02-28 PROCEDURE — 84100 ASSAY OF PHOSPHORUS: CPT

## 2019-02-28 PROCEDURE — 85610 PROTHROMBIN TIME: CPT

## 2019-02-28 PROCEDURE — 96366 THER/PROPH/DIAG IV INF ADDON: CPT

## 2019-02-28 PROCEDURE — 84443 ASSAY THYROID STIM HORMONE: CPT

## 2019-02-28 PROCEDURE — 97162 PT EVAL MOD COMPLEX 30 MIN: CPT

## 2019-02-28 PROCEDURE — 87040 BLOOD CULTURE FOR BACTERIA: CPT

## 2019-02-28 PROCEDURE — 85025 COMPLETE CBC W/AUTO DIFF WBC: CPT

## 2019-02-28 PROCEDURE — 80061 LIPID PANEL: CPT

## 2019-02-28 PROCEDURE — 97167 OT EVAL HIGH COMPLEX 60 MIN: CPT

## 2019-02-28 PROCEDURE — 84439 ASSAY OF FREE THYROXINE: CPT

## 2019-02-28 PROCEDURE — 80048 BASIC METABOLIC PNL TOTAL CA: CPT

## 2019-02-28 PROCEDURE — 80053 COMPREHEN METABOLIC PANEL: CPT

## 2019-02-28 PROCEDURE — 96375 TX/PRO/DX INJ NEW DRUG ADDON: CPT

## 2019-02-28 PROCEDURE — 83605 ASSAY OF LACTIC ACID: CPT

## 2019-02-28 PROCEDURE — 92610 EVALUATE SWALLOWING FUNCTION: CPT

## 2019-02-28 PROCEDURE — 85730 THROMBOPLASTIN TIME PARTIAL: CPT

## 2019-02-28 PROCEDURE — 76705 ECHO EXAM OF ABDOMEN: CPT

## 2019-02-28 PROCEDURE — 82962 GLUCOSE BLOOD TEST: CPT

## 2019-02-28 RX ADMIN — ATORVASTATIN CALCIUM 1 MG: 10 TABLET, FILM COATED ORAL at 21:23

## 2019-02-28 RX ADMIN — ACETAMINOPHEN 1 MG: 325 TABLET, FILM COATED ORAL at 10:41

## 2019-02-28 RX ADMIN — CALCIUM GLUCONATE SCH MLS/HR: 94 INJECTION, SOLUTION INTRAVENOUS at 16:23

## 2019-02-28 RX ADMIN — ATORVASTATIN CALCIUM SCH MG: 10 TABLET, FILM COATED ORAL at 21:23

## 2019-02-28 RX ADMIN — POTASSIUM ACETATE 1 MLS/HR: 3.93 INJECTION, SOLUTION, CONCENTRATE INTRAVENOUS at 16:23

## 2019-02-28 RX ADMIN — CEFEPIME 1 MLS/HR: 1 INJECTION, POWDER, FOR SOLUTION INTRAMUSCULAR; INTRAVENOUS at 10:41

## 2019-02-28 RX ADMIN — ACETAMINOPHEN 1 MG: 325 TABLET, FILM COATED ORAL at 21:23

## 2019-02-28 RX ADMIN — INSULIN ASPART 1 UNIT: 100 INJECTION, SOLUTION INTRAVENOUS; SUBCUTANEOUS at 21:00

## 2019-02-28 RX ADMIN — PIPERACILLIN SODIUM AND TAZOBACTAM SODIUM SCH MLS/HR: 3; .375 INJECTION, POWDER, LYOPHILIZED, FOR SOLUTION INTRAVENOUS at 18:10

## 2019-02-28 RX ADMIN — PIPERACILLIN SODIUM AND TAZOBACTAM SODIUM 1 MLS/HR: 3; .375 INJECTION, POWDER, LYOPHILIZED, FOR SOLUTION INTRAVENOUS at 18:10

## 2019-02-28 RX ADMIN — CIPROFLOXACIN HYDROCHLORIDE SCH DROP: 3 SOLUTION/ DROPS OPHTHALMIC at 17:20

## 2019-02-28 RX ADMIN — HEPARIN SODIUM 1 UNIT: 5000 INJECTION, SOLUTION INTRAVENOUS; SUBCUTANEOUS at 21:27

## 2019-02-28 RX ADMIN — VANCOMYCIN HYDROCHLORIDE 1 MLS/HR: 1 INJECTION, POWDER, LYOPHILIZED, FOR SOLUTION INTRAVENOUS at 11:18

## 2019-02-28 RX ADMIN — KETOROLAC TROMETHAMINE 1 MG: 15 INJECTION, SOLUTION INTRAMUSCULAR; INTRAVENOUS at 10:41

## 2019-02-28 RX ADMIN — HEPARIN SODIUM SCH UNIT: 5000 INJECTION, SOLUTION INTRAVENOUS; SUBCUTANEOUS at 21:27

## 2019-02-28 RX ADMIN — CIPROFLOXACIN HYDROCHLORIDE 1 DROP: 3 SOLUTION/ DROPS OPHTHALMIC at 17:20

## 2019-02-28 RX ADMIN — INSULIN ASPART 1 UNIT: 100 INJECTION, SOLUTION INTRAVENOUS; SUBCUTANEOUS at 18:09

## 2019-02-28 RX ADMIN — THIAMINE HYDROCHLORIDE 1 ML: 100 INJECTION, SOLUTION INTRAMUSCULAR; INTRAVENOUS at 10:14

## 2019-02-28 NOTE — HP
DATE OF ADMISSION: 02/28/2019

 

CHIEF COMPLAINT:  Abdominal pain, vomiting and fever.  

 

HISTORY OF PRESENT ILLNESS:  A 79-year-old female with past medical history of high blood pressure, h
igh cholesterol, diabetes, prior cholecystectomy, with biliary stent placement, who has been having s
ome fever and chills at home.  She is also complaining of some abdominal pain.  She has also had some
 nausea symptoms, nonbilious, nonbloody vomiting and symptoms have been going on for the last 3 or 4 
days.  Denies any upper or lower GI bleeding, no diarrhea or constipation.  No chest pain, no shortne
ss of breath.  She also has been feeling some mild headache symptoms and decreased p.o. intake.  When
 she came in today, she was found with slightly elevated white blood cell count of 11,000.  Her lacti
c acid was elevated at 2.1.  She also had a fever of 102.3 and her UA showed signs of positive nitrit
es, signs of UTI and she was given antibiotics in the ER.  She also had an abdominal ultrasound perfo
rmed that showed status post cholecystectomy with physiological dilation of the CBD and air within th
e biliary tree, CBD stent not visualized by ultrasound, simple cyst in the right kidney.  Her LFTs ap
pear to be normal.

 

PAST MEDICAL HISTORY:  As above.

 

ALLERGIES:  No known drug allergies.

 

MEDICATIONS AT HOME:

1.  Atorvastatin 10 mg at bedtime.

2.  Toprol-XL 25 mg daily.

3.  Bas glargine insulin 16 units daily.

4.  Metformin 850 mg b.i.d.

 

PAST SURGICAL HISTORY:  The patient has a cholecystectomy in the past and hysterectomy in the past.

 

SOCIAL HISTORY:  Negative for smoking, drinking, or IV drug use. 

 

FAMILY HISTORY:  Positive for diabetes.

 

PHYSICAL EXAMINATION:

VITAL SIGNS:  T-max 102.3.  Pulse 124 to 88, respirations 18 to 20, blood pressure is 142 to 100 syst
olic over 63 to 58 diastolic, satting at 98% through his nasal cannula.

GENERAL:  The patient is lying in bed, family members at the bedside.  No acute distress.

HEENT:  Pupils equal, round, react to light.  Extraocular muscles intact.

NECK:  Supple, no thyromegaly.

LUNGS:  Clear to auscultation bilaterally.

CARDIOVASCULAR:  S1, S2 heard.  No murmurs, rubs or gallops.

ABDOMEN:  Mild tenderness to palpation in the epigastric and suprapubic area, otherwise no rebound or
 guarding.  Normal bowel sounds otherwise.

MUSCULOSKELETAL:  No lower extremity edema bilaterally.

NEUROLOGIC:  No apparent focal deficits.

 

LABORATORIES:  WBC 10.9, hemoglobin 13.3, hematocrit 40.8, platelets 239.  The comprehensive metaboli
c panel was normal.  Again, the lactic acid is a little high at 2.1.  

 

ASSESSMENT AND PLAN:  A 79-year-old female coming in with nausea, vomiting, abdominal pain, fevers, a
nd chills, signs of sepsis secondary to UTIs.

1.  Sepsis secondary to urinary tract infection, again, patient had fevers and chills, nausea, vomiti
ng symptoms, decreased p.o. intake.  UA positive for UTI, so admit the patient, put her on broad spec
trum antibiotics, get TSH, A1c, lipid panel.  We also get PT and OT, speech therapy consult.  Follow 
up final culture results.  Low dose IV fluids, Tylenol p.r.n. pain and fevers.  If his symptoms worse
n, consider ID consult.  Follow up final culture results.

2.  History of diabetes.  Continue bas glargine insulin, both sliding scale insulin on board as well.
  Follow up A1c.

3.  History of high cholesterol.  Follow up lipid panel.  Continue statin.

4.  Hypertension.  Blood pressure stable.  Continue to monitor for now.  

5.  Hydralazine p.r.n. systolic greater than 160.

6.  Gastrointestinal prophylaxis with PPI and deep venous thrombosis prophylaxis, heparin subcu.

 

 

Dictated By: CANDELARIO BURGOS

DD:    02/28/2019 15:51:35

DT:    02/28/2019 16:16:24

Conf#: [id number]

DID#:  [id number]

## 2019-02-28 NOTE — ERD
ER Documentation


Chief Complaint


Chief Complaint





Complains of a fever with abdominal pain and vomiting





HPI


This is a 79-year-old female with a past medical history of hypertension, 


hyperlipidemia, diabetes, previous cholecystectomy and biliary stent placement 


who is presenting with 2-3 days of progressive worsening fever, chills, s


uprapubic cramping aching moderate abdominal pain with dysuria, nausea with 


multiple episodes of nonbilious nonbloody vomiting and a dry cough.  The patient


does not endorse any black or bloody or tarry stools.  She does not endorse 


constipation or diarrhea.  She does report a mild throbbing frontal headache 


with no vision changes.  The patient and her family do not endorse any 


alleviating or exacerbating factors.





The patient does not endorse neck or back pain. The patient denies 


lightheadedness or dizziness.  The patient has had no chest pain or trouble 


breathing. The patient has had no focal deficits.  The patient has had no 


weakness or numbness or tingling to the face or extremities.





ROS


All systems reviewed and are negative except as per history of present illness.





Medications


Home Meds


Reported Medications


Insulin Glargine,Hum.rec.anlog (Basaglar Kwikpen U-100) 100 Unit/1 Ml 


Insuln.pen, 16 UNIT SC DAILY, EA


   4/26/18


Metoprolol Succinate* (Toprol XL*) 25 Mg Tab.sr.24h, 25 MG PO DAILY, #30 TAB


   4/26/18


Atorvastatin Calcium (Atorvastatin Calcium) 10 Mg Tablet, 10 MG PO QHS, #30 TAB


   4/26/18


Metformin* (Glucophage*) 850 Mg Tablet, 850 MG PO BID WITH MEALS, #30 TAB


   4/26/18


Discontinued Scripts


Cephalexin* (Keflex*) 500 Mg Capsule, 500 MG PO TID for 5 Days, CAP


   Prov:LATONYA PULIDO MD         5/28/18





Allergies


Allergies:  


Coded Allergies:  


     No Known Allergy (Unverified , 9/7/17)





PMhx/Soc


History of Surgery:  Yes (Gall bladder surgery, hysterectomy)


Anesthesia Reaction:  No


Hx Neurological Disorder:  No


Hx Respiratory Disorders:  No


Hx Cardiac Disorders:  Yes (HIGH CHOLESTEROL )


Hx Psychiatric Problems:  No


Hx Miscellaneous Medical Probl:  Yes (DM)


Hx Alcohol Use:  No


Hx Substance Use:  No


Hx Tobacco Use:  No


Smoking Status:  Never smoker





FmHx


Family History:  diabetes





Physical Exam


Vitals





Vital Signs


  Date      Temp   Pulse  Resp  B/P (MAP)   Pulse Ox  O2         O2 Flow    FiO2


Time                                                  Delivery   Rate


   2/28/19   98.9     88    22      100/58        98  Room Air


     12:45                            (72)


   2/28/19  101.7    100    20      112/57        97  Room Air


     11:25                            (75)


   2/28/19  102.3


     10:41


   2/28/19           106    18      122/64        98  Nasal            2.0


     10:40                            (83)            Cannula


   2/28/19                                            Nasal              2


     10:10                                            Cannula


   2/28/19  102.3    124    20      142/63        98


     09:48                            (89)





Physical Exam


Const:   No apparent distress, well-developed, well-nourished


Head:   Normocephalic, Atraumatic 


Eyes:   Normal Conjunctiva.  Extraocular movements intact. Pupils equal, round 


and reactive to light


ENT:   Normal External Ears, Nose and Mouth.


Neck:   Full range of motion. No meningismus.


Resp:   Clear to auscultation bilaterally, No wheezes, rales or rhonchi


Cardio:   Regular rhythm.  Tachycardia.  No murmurs, rubs or gallops


Abd:   Soft, non distended.  Mild suprapubic tenderness.  Normal bowel sounds


Skin:   No petechiae or rashes


Back:   No midline tenderness. No CVA tenderness


Ext:   No cyanosis, or edema


Neur:   Awake and alert, oriented 4.  Cranial nerves intact.  No facial droop. 


Normal strength, sensation and coordination.


Psych:   Normal Mood and Affect


Result Diagram:  


2/28/19 0956                                                                    


           2/28/19 0956





Results 24 hrs





Laboratory Tests


Test
               2/28/19
09:56  2/28/19
10:07    2/28/19
10:30  2/28/19
12:39


White Blood Count   10.9 10^3/ul


Red Blood Count     4.40 10^6/ul


Hemoglobin             13.3 g/dl


Hematocrit                40.8 %


Mean Corpuscular         92.7 fl


Volume


Mean Corpuscular         30.2 pg


Hemoglobin


Mean Corpuscular      32.6 g/dl 
  
              
                



Hemoglobin
Concen


t


Red Cell                  13.7 %


Distribution


Width


Platelet Count       239 10^3/UL


Mean Platelet             9.4 fl


Volume


Immature                 0.300 %


Granulocytes %


Neutrophils %             75.5 %


Lymphocytes %             16.3 %


Monocytes %                7.8 %


Eosinophils %              0.0 %


Basophils %                0.1 %


Nucleated Red        0.0 /100WBC


Blood Cells %


Immature           0.030 10^3/ul


Granulocytes #


Neutrophils #        8.2 10^3/ul


Lymphocytes #        1.8 10^3/ul


Monocytes #          0.9 10^3/ul


Eosinophils #        0.0 10^3/ul


Basophils #          0.0 10^3/ul


Nucleated Red        0.0 10^3/ul


Blood Cells #


Prothrombin Time        13.4 Sec


Prothrombin Time             1.0


Ratio


INR International          1.01 
  
              
                



Normalized
Ratio


Activated              32.6 Sec 
  
              
                



Partial
Thrombopl


ast Time


Sodium Level          139 mmol/L


Potassium Level       4.5 mmol/L


Chloride Level        105 mmol/L


Carbon Dioxide         23 mmol/L


Level


Anion Gap                     11


Blood Urea              17 mg/dl


Nitrogen


Creatinine            0.95 mg/dl


Est Glomerular      mL/min 
       
              
                



Filtrat


Rate
mL/min


Glucose Level          274 mg/dl


Calcium Level          9.4 mg/dl


Total Bilirubin        0.7 mg/dl


Direct Bilirubin      0.00 mg/dl


Indirect               0.7 mg/dl


Bilirubin


Aspartate Amino         24 IU/L 
  
              
                



Transf
(AST/SGOT)


Alanine                 12 IU/L 
  
              
                



Aminotransferase



(ALT/SGPT)


Alkaline                 55 IU/L


Phosphatase


Troponin I         < 0.012 ng/ml


Total Protein           8.2 g/dl


Albumin                 4.3 g/dl


Globulin               3.90 g/dl


Albumin/Globulin            1.10


Ratio


POC Venous                           2.1 mmol/L                      1.2 mmol/L


Lactate


Urine Color                                       YELLOW


Urine Clarity
     
               
              SLIGHTLY
CLOUDY  



Urine pH                                                     6.0


Urine Specific                                             1.021


Gravity


Urine Ketones                                           1+ mg/dL


Urine Nitrite                                     POSITIVE mg/dL


Urine Bilirubin                                   NEGATIVE mg/dL


Urine                                                   1+ mg/dL


Urobilinogen


Urine Leukocyte    
               
              NEGATIVE
Ok/ul  



Esterase



Urine Microscopic                                         8 /HPF


RBC


Urine Microscopic                                         9 /HPF


WBC


Urine Bacteria                                    MANY /HPF


Urine Mucus                                       FEW /HPF


Urine Hemoglobin                                        1+ mg/dL


Urine Glucose                                           3+ mg/dL


Urine Total                                             1+ mg/dl


Protein


Test
               2/28/19
14:20  
              
                



Lactic Acid Level     0.8 mmol/L





Current Medications


 Medications
   Dose
          Sig/Poly
       Start Time
   Status  Last


 (Trade)       Ordered        Route
 PRN     Stop Time              Admin
Dose


                              Reason                                Admin


 Sodium         1,520 ml       BOLUS OVER 2   2/28/19       DC           2/28/19


Chloride
                     HOURS STAT
    10:05
                       10:14



(NS)                          IV*
           2/28/19 10:07


 Vancomycin     250 ml @ 
     ONCE  ONCE
    2/28/19       DC           2/28/19


HCl            125 mls/hr     IVPB
          11:00
                       11:18



                                             2/28/19 12:59


 Cefepime HCl   50 ml @ 
      ONCE  ONCE
    2/28/19       DC           2/28/19


               100 mls/hr     IVPB
          11:00
                       10:41



                                             2/28/19 11:29


                650 mg         ONCE  ONCE
    2/28/19       DC           2/28/19


Acetaminophen                 PO
            11:00
                       10:41




  (Tylenol                                  2/28/19 11:01


Tab)


 Ketorolac
     15 mg          ONCE  STAT
    2/28/19       DC           2/28/19


Tromethamine
                 IV
            10:36
                       10:41



 (Toradol)                                   2/28/19 10:38


 Ondansetron    4 mg           BRIDGE ORDER   2/28/19       DC       



HCl
  (Zofran                 PRN
 IV
       13:30



Inj)                          NAUSEA/VOMITI  2/28/19 15:02


                              NG


                650 mg         ER BRIDGE      2/28/19       DC       



Acetaminophen                 PRN
 PO
       13:30




  (Tylenol                   .MILD PAIN     2/28/19 15:02


Tab)                          1-3 OR TEMP


 IV Flush
      3 ml           PER            2/28/19                



(NS 3 ml)                     PROTOCOL
 IV
  15:00



 Ondansetron    4 mg           Q6H  PRN
      2/28/19                



HCl
  (Zofran                 IV
            15:00



Inj)                          NAUSEA/VOMITI


                              NG


                650 mg         Q6H  PRN
      2/28/19                



Acetaminophen                 PO
 .PAIN 1-3  15:00




  (Tylenol                   OR TEMP


Tab)


                1 tab          Q6H  PRN
      2/28/19                



Acetaminophen                 PO
 .MOD PAIN  15:00



/
                            4-6


Hydrocodone


Bitart



(Norco


(5/325))


 Morphine       2 mg           Q4H  PRN
      2/28/19                



Sulfate
                      IV
 .SEVERE    15:00



(morphine)                    PAIN 7-10


 Docusate       100 mg         Q12H  PRN
     2/28/19                



Sodium
                       PO
            15:00



(Colace)                      .CONSTIPATION


 Magnesium
     30 ml          DAILY  PRN
    2/28/19                



Hydroxide
                    PO
            15:00



(Milk Of Mag)                 .CONSTIPATION


                40 mg          DAILY@06
      3/1/19                 



Pantoprazole
                 PO
            06:00



 (Protonix


Tab)


 Heparin        5,000 unit     Q12
 SC
       2/28/19                



Sodium
                                      21:00



(Porcine)



(Heparin


(5000



Units/1ml))


 Sodium         1,000 ml @ 
   [id number]
 IV
   2/28/19                



Chloride       75 mls/hr                     14:33



 Lorazepam
     0.5 mg         Q6H  PRN
      2/28/19                



(Ativan)                      IV
 ANXIETY    15:00



 Albuterol/
    3 ml           Q4H RESP       2/28/19                



Ipratropium
                  THERAPY  PRN
  15:00



(Duoneb)                      HHN



                              SHORTNESS OF


                              BREATH


 Piperacillin   100 ml @ 
     Q6
 IVPB
      2/28/19                



Sod/
          200 mls/hr                    18:00



Tazobactam


Sod


                1 tab          Q5M  PRN
      2/28/19                



Nitroglycerin                 SL
 ANGINA     15:00







(Nitroglyceri


n
  (Sl Tab)


0.4 Mg)


                Discontinue
   ONCE  ONCE
    2/28/19       DC       



Miscellaneous  current oral
  XX
            15:00




              sulfonylur...                 2/28/19 15:01


Information



(*


Miscellaneous



 Pharmacy


Order)


 Diagnostic     1 ea           02
 XX
        3/1/19                 



Test
  (Pha)
                                02:00



 (Accu-Chek)


                               ONCE  ONCE
    2/28/19       DC       



Miscellaneous  HYPOGLYCEMIA
  XX
            15:00




              PROTOCOL
                     2/28/19 15:01


Information
   w...


(*


Miscellaneous



 Pharmacy


Order)


 Insulin        NOVOLOG
       Q4
 SC
        2/28/19                



Aspart
        *MILD*
                       17:00



(Novolog       ALGORI...


Insulin
 Pen)


                Discontinue
   ONCE  ONCE
    2/28/19       DC       



Miscellaneous  all
 previ...  XX
            15:00




                                            2/28/19 15:01


Information



(*


Miscellaneous



 Pharmacy


Order)


                10 mg          QHS
 PO
       2/28/19                



Atorvastatin
                                21:00



Calcium



(Lipitor)


 Insulin        16 unit        DAILY
 SC
     3/1/19        Cancel   



Glargine
                                    09:00



(Lantus)


 Metoprolol
    25 mg          DAILY
 PO
     3/1/19                 



Succinate
                                   09:00



(Toprol Xl)


                1 ea           NOTE
 XX
      2/28/19                



Miscellaneous                                15:00




 Information


 Glucose
       15 gm          Q15M  PRN
     2/28/19                



(Glutose)                     PO
 DECREASED  15:00



                              GLUCOSE


 Glucose
       22.5 gm        Q15M  PRN
     2/28/19                



(Glutose)                     PO
 DECREASED  15:00



                              GLUCOSE


 Dextrose
      25 ml          Q15M  PRN
     2/28/19                



(D50w                         IV
 DECREASED  15:00



Syringe)                      GLUCOSE


 Dextrose
      50 ml          Q15M  PRN
     2/28/19                



(D50w                         IV
 DECREASED  15:00



Syringe)                      GLUCOSE


 Glucagon
      1 mg           Q15M  PRN
     2/28/19                



(Glucagen)                    IM
 DECREASED  15:00



                              GLUCOSE


 Glucose
       15 gm          Q15M  PRN
     2/28/19                



(Glutose)                     BUCCAL
        15:00



                              DECREASED


                              GLUCOSE


 Insulin        16 units       DAILY
 SC
     3/1/19                 



Glargine
                                    09:00



(Lantus)








Procedures/MDM


MDM





The patient's presentation warrants further investigation. Previous medical 


records, if available, were reviewed.





LABS





The patient's laboratory testing was obtained and reviewed. No emergent 


treatment was required unless described below.





CBC:   No E/o of systemic infection or severe anemia or thrombocytopenia


Chemistry:   No E/o severe acidosis or alkalosis or renal failure or liver 


disease. 


PT/INR:   No E/o significant coagulopathy


Lactate:   E/o severe sepsis.  Repeat is within normal limits.


Troponin:   No E/o acute ischemia


Urine:   E/o acute infection with hematuria





EKG





EKG read by me: 


Rate/Rhythm:    Sinus tachycardia at 106 bpm


Intervals:    Normal


Axis:    Normal


Impression:    No evidence of acute ischemia.  Sinus tachycardia





IMAGING





Imaging and Radiology interpretation reviewed.





Chest x-ray


FINDINGS: The heart and mediastinum are within normal limits.  There is a 


cardiac recording device overlying the heart.  There is a stent in the region of


the CBD. There is air within the intrahepatic ducts.  The lungs are clear.  


There is no pleural effusion or pneumothorax.   


IMPRESSION: No focal infiltrates. Air within the intrahepatic ducts with a CBD 


stent partially visualized.


Electronically viewed and signed by .Seth Wolfe MD, MD on 02/28/2019 


10:53 





Ultrasound abdomen


FINDINGS: The liver demonstrates normal echogenicity.  The liver is normal in 


size and no focal solid lesions are seen. The liver measures 11.7 cm in length. 


The portal vein is patent with normal direction of flow.  There is air within 


the biliary tree. The patient is status post cholecystectomy. The common bile 


duct measures 9.7 mm in maximal dimension.  The visualized portions of the 


pancreas are unremarkable.   The tail of the pancreas is not seen.  No free 


fluid is identified.  The right kidney demonstrate normal echogenicity and 


cortical thickness.  The right kidney measures 8.4 cm in long dimension.  There 


is no evidence of hydronephrosis. There are no kidney stones.  There is a 1.8 cm


simple cyst.


IMPRESSION: Status post cholecystectomy with physiologic dilatation of the CBD. 


Air within the biliary tree. CBD stent not visualized by ultrasound. Simple cyst


in the right kidney.


Electronically viewed and signed by .Seth Wolfe MD, MD on 02/28/2019 


12:01 





TREATMENT/DISPOSITION





The patient presents with symptoms concerning for severe sepsis related to 


urinary tract infection.  The patient was febrile, tachycardic with a lactic ac


idosis.  A septic workup was completed as described below. 





The patient did endorse a cough, but I do not see any evidence of an upper 


respiratory infection, pneumonia or the flu at this time.





The patient is hyperglycemic, but there is no evidence of DKA.  This may be 


managed as needed.





The patient's x-ray revealed concerns of possible pneumobilia.  The patient's 


ultrasound also reveals this.  The patient does not have right upper quadrant 


tenderness on my exam.  The chronicity of this is unclear.  I do not feel the 


patient requires emergent surgical consultation or intervention.  The patient 


may benefit from gastroenterology evaluation in the hospital.  Gas can also be 


seen with infection, and the patient does have a foreign body in the stent.  The


patient was treated with antibiotics and may be further assessed in the 


hospital.





SEPSIS NOTE





SIRS Criteria:      Fever, tachycardia   





Infectious source:    UTI





End organ damage indicated by: Lactate > 2.0 mmol/L





SEPSIS MANAGEMENT





Time to recognize sepsis:    10:15 AM


Time to recognize severe sepsis:   10:15 AM.


Time to recognize septic shock:    [No septic shock at this time].





3 HOUR BUNDLE





Blood cultures x 2 before abx:    [Yes]


30 ml/kg NS bolus    [Completed]


Initial lactate       2.1


Repeat lactate        1.2





SEPTIC SHOCK ASSESSMENT:





[NO] lactic acid > 4.0 


[NO] persistent hypotension (SBP < 90 or 40 mmHg drop, MAP < 65) despite 30 L/kg


 IV fluid bolus





CRITICAL CARE





Critical care time [35] minutes





Emergent fluid management while maintaining close respiratory support.  


Provision of immediate and broad-spectrum antibiotic therapy.  Simultaneous 


assessment for possible sources in order to direct targeted therapy.  


Consideration for invasive and chemical support to prevent cardiopulmonary 


collapse.  Critical care time is independent of procedures performed.





ADMISSION





The patient will be admitted to [Panel] in accordance with the patient's 


insurance.  The patient was accepted by Dr. Paredes at 12:55 PM on February 28, 2019.





Disclaimer: Inadvertent spelling and grammatical errors are likely due to 


EHR/dictation software use and do not reflect on the overall quality of patient 


care. Note that the electronic time recorded on this note does not necessarily 


reflect the actual time of the patient encounter.





Departure


Diagnosis:  


   Primary Impression:  


   Severe sepsis


   Additional Impressions:  


   UTI (urinary tract infection)


   Urinary tract infection type:  acute cystitis  Hematuria presence:  without 


   hematuria  Qualified Codes:  N30.00 - Acute cystitis without hematuria


   Nausea & vomiting


   Vomiting type:  unspecified  Vomiting Intractability:  non-intractable  


   Qualified Codes:  R11.2 - Nausea with vomiting, unspecified


   Suprapubic pain


   Fever


   Fever type:  unspecified  Qualified Codes:  R50.9 - Fever, unspecified


   Tachycardia


   Lactic acidosis


   Pneumobilia


Condition:  Serious











ELIDIA GANDHI MD              Feb 28, 2019 13:01

## 2019-03-01 VITALS — RESPIRATION RATE: 16 BRPM | SYSTOLIC BLOOD PRESSURE: 98 MMHG | HEART RATE: 70 BPM | DIASTOLIC BLOOD PRESSURE: 57 MMHG

## 2019-03-01 VITALS — RESPIRATION RATE: 18 BRPM | DIASTOLIC BLOOD PRESSURE: 59 MMHG | HEART RATE: 80 BPM | SYSTOLIC BLOOD PRESSURE: 118 MMHG

## 2019-03-01 VITALS — RESPIRATION RATE: 18 BRPM | HEART RATE: 76 BPM | SYSTOLIC BLOOD PRESSURE: 108 MMHG | DIASTOLIC BLOOD PRESSURE: 56 MMHG

## 2019-03-01 VITALS — HEART RATE: 67 BPM | DIASTOLIC BLOOD PRESSURE: 57 MMHG | SYSTOLIC BLOOD PRESSURE: 118 MMHG | RESPIRATION RATE: 16 BRPM

## 2019-03-01 LAB
ADD MAN DIFF?: NO
ANION GAP: 8 (ref 5–13)
BASOPHIL #: 0 10^3/UL (ref 0–0.1)
BASOPHILS %: 0.4 % (ref 0–2)
BLOOD UREA NITROGEN: 16 MG/DL (ref 7–20)
CALCIUM: 7.9 MG/DL (ref 8.4–10.2)
CARBON DIOXIDE: 22 MMOL/L (ref 21–31)
CHLORIDE: 109 MMOL/L (ref 97–110)
CHOL/HDL RATIO: 4 RATIO
CHOLESTEROL: 77 MG/DL (ref 100–200)
CREATININE: 0.88 MG/DL (ref 0.44–1)
EOSINOPHILS #: 0.1 10^3/UL (ref 0–0.5)
EOSINOPHILS %: 1.1 % (ref 0–7)
GLUCOSE: 102 MG/DL (ref 70–220)
HDL CHOLESTEROL: 19 MG/DL (ref 33–92)
HEMATOCRIT: 35 % (ref 37–47)
HEMOGLOBIN A1C: 7.8 % (ref 0–5.9)
HEMOGLOBIN: 11.1 G/DL (ref 12–16)
IMMATURE GRANS #M: 0.03 10^3/UL (ref 0–0.03)
IMMATURE GRANS % (M): 0.4 % (ref 0–0.43)
LACTIC ACID: 0.7 MMOL/L (ref 0.5–2)
LACTIC ACID: 1.2 MMOL/L (ref 0.5–2)
LDL CHOLESTEROL,CALCULATED: 36 MG/DL
LYMPHOCYTES #: 1.3 10^3/UL (ref 0.8–2.9)
LYMPHOCYTES %: 16.5 % (ref 15–51)
MAGNESIUM: 1.8 MG/DL (ref 1.7–2.5)
MEAN CORPUSCULAR HEMOGLOBIN: 30.1 PG (ref 29–33)
MEAN CORPUSCULAR HGB CONC: 31.7 G/DL (ref 32–37)
MEAN CORPUSCULAR VOLUME: 94.9 FL (ref 82–101)
MEAN PLATELET VOLUME: 9.7 FL (ref 7.4–10.4)
MONOCYTE #: 0.6 10^3/UL (ref 0.3–0.9)
MONOCYTES %: 7.2 % (ref 0–11)
NEUTROPHIL #: 6.1 10^3/UL (ref 1.6–7.5)
NEUTROPHILS %: 74.4 % (ref 39–77)
NUCLEATED RED BLOOD CELLS #: 0 10^3/UL (ref 0–0)
NUCLEATED RED BLOOD CELLS%: 0 /100WBC (ref 0–0)
PHOSPHORUS: 2.8 MG/DL (ref 2.5–4.9)
PLATELET COUNT: 211 10^3/UL (ref 140–415)
POTASSIUM: 4.1 MMOL/L (ref 3.5–5.1)
RED BLOOD COUNT: 3.69 10^6/UL (ref 4.2–5.4)
RED CELL DISTRIBUTION WIDTH: 13.7 % (ref 11.5–14.5)
SODIUM: 139 MMOL/L (ref 135–144)
THYROID STIMULATING HORMONE: 1.75 MIU/L (ref 0.47–4.68)
TRIGLYCERIDES: 110 MG/DL (ref 0–149)
WHITE BLOOD COUNT: 8.1 10^3/UL (ref 4.8–10.8)

## 2019-03-01 RX ADMIN — CIPROFLOXACIN HYDROCHLORIDE 1 DROP: 3 SOLUTION/ DROPS OPHTHALMIC at 10:49

## 2019-03-01 RX ADMIN — CIPROFLOXACIN HYDROCHLORIDE 1 DROP: 3 SOLUTION/ DROPS OPHTHALMIC at 09:00

## 2019-03-01 RX ADMIN — DOCUSATE SODIUM 1 MG: 100 CAPSULE, LIQUID FILLED ORAL at 05:15

## 2019-03-01 RX ADMIN — ATORVASTATIN CALCIUM SCH MG: 10 TABLET, FILM COATED ORAL at 20:54

## 2019-03-01 RX ADMIN — METOPROLOL SUCCINATE SCH MG: 25 TABLET, EXTENDED RELEASE ORAL at 08:22

## 2019-03-01 RX ADMIN — POTASSIUM ACETATE 1 MLS/HR: 3.93 INJECTION, SOLUTION, CONCENTRATE INTRAVENOUS at 09:55

## 2019-03-01 RX ADMIN — INSULIN ASPART 1 UNIT: 100 INJECTION, SOLUTION INTRAVENOUS; SUBCUTANEOUS at 20:54

## 2019-03-01 RX ADMIN — ONDANSETRON HYDROCHLORIDE 1 MG: 2 INJECTION, SOLUTION INTRAMUSCULAR; INTRAVENOUS at 17:28

## 2019-03-01 RX ADMIN — CIPROFLOXACIN HYDROCHLORIDE SCH DROP: 3 SOLUTION/ DROPS OPHTHALMIC at 10:49

## 2019-03-01 RX ADMIN — INSULIN ASPART 1 UNIT: 100 INJECTION, SOLUTION INTRAVENOUS; SUBCUTANEOUS at 05:00

## 2019-03-01 RX ADMIN — INSULIN ASPART 1 UNIT: 100 INJECTION, SOLUTION INTRAVENOUS; SUBCUTANEOUS at 13:00

## 2019-03-01 RX ADMIN — HEPARIN SODIUM SCH UNIT: 5000 INJECTION, SOLUTION INTRAVENOUS; SUBCUTANEOUS at 20:58

## 2019-03-01 RX ADMIN — PIPERACILLIN SODIUM AND TAZOBACTAM SODIUM 1 MLS/HR: 3; .375 INJECTION, POWDER, LYOPHILIZED, FOR SOLUTION INTRAVENOUS at 00:15

## 2019-03-01 RX ADMIN — CALCIUM GLUCONATE SCH MLS/HR: 94 INJECTION, SOLUTION INTRAVENOUS at 09:55

## 2019-03-01 RX ADMIN — PIPERACILLIN SODIUM AND TAZOBACTAM SODIUM SCH MLS/HR: 3; .375 INJECTION, POWDER, LYOPHILIZED, FOR SOLUTION INTRAVENOUS at 12:23

## 2019-03-01 RX ADMIN — HYDROCODONE BITARTRATE AND ACETAMINOPHEN 1 TAB: 5; 325 TABLET ORAL at 15:29

## 2019-03-01 RX ADMIN — CALCIUM GLUCONATE SCH MLS/HR: 94 INJECTION, SOLUTION INTRAVENOUS at 03:53

## 2019-03-01 RX ADMIN — HEPARIN SODIUM 1 UNIT: 5000 INJECTION, SOLUTION INTRAVENOUS; SUBCUTANEOUS at 08:24

## 2019-03-01 RX ADMIN — PIPERACILLIN SODIUM AND TAZOBACTAM SODIUM 1 MLS/HR: 3; .375 INJECTION, POWDER, LYOPHILIZED, FOR SOLUTION INTRAVENOUS at 18:11

## 2019-03-01 RX ADMIN — INSULIN GLARGINE 1 UNITS: 100 INJECTION, SOLUTION SUBCUTANEOUS at 08:24

## 2019-03-01 RX ADMIN — INSULIN ASPART 1 UNIT: 100 INJECTION, SOLUTION INTRAVENOUS; SUBCUTANEOUS at 02:00

## 2019-03-01 RX ADMIN — MORPHINE SULFATE 1 MG: 2 INJECTION, SOLUTION INTRAMUSCULAR; INTRAVENOUS at 08:35

## 2019-03-01 RX ADMIN — METOPROLOL SUCCINATE 1 MG: 25 TABLET, EXTENDED RELEASE ORAL at 08:22

## 2019-03-01 RX ADMIN — GUAIFENESIN 1 MG: 100 SOLUTION ORAL at 10:48

## 2019-03-01 RX ADMIN — INSULIN ASPART 1 UNIT: 100 INJECTION, SOLUTION INTRAVENOUS; SUBCUTANEOUS at 08:20

## 2019-03-01 RX ADMIN — INSULIN ASPART 1 UNIT: 100 INJECTION, SOLUTION INTRAVENOUS; SUBCUTANEOUS at 18:00

## 2019-03-01 RX ADMIN — HEPARIN SODIUM 1 UNIT: 5000 INJECTION, SOLUTION INTRAVENOUS; SUBCUTANEOUS at 20:58

## 2019-03-01 RX ADMIN — HEPARIN SODIUM SCH UNIT: 5000 INJECTION, SOLUTION INTRAVENOUS; SUBCUTANEOUS at 08:24

## 2019-03-01 RX ADMIN — PANTOPRAZOLE SODIUM SCH MG: 40 TABLET, DELAYED RELEASE ORAL at 05:15

## 2019-03-01 RX ADMIN — CIPROFLOXACIN HYDROCHLORIDE SCH DROP: 3 SOLUTION/ DROPS OPHTHALMIC at 09:00

## 2019-03-01 RX ADMIN — INSULIN GLARGINE SCH UNITS: 100 INJECTION, SOLUTION SUBCUTANEOUS at 08:24

## 2019-03-01 RX ADMIN — PIPERACILLIN SODIUM AND TAZOBACTAM SODIUM SCH MLS/HR: 3; .375 INJECTION, POWDER, LYOPHILIZED, FOR SOLUTION INTRAVENOUS at 05:15

## 2019-03-01 RX ADMIN — GUAIFENESIN PRN MG: 100 SOLUTION ORAL at 10:48

## 2019-03-01 RX ADMIN — PANTOPRAZOLE SODIUM 1 MG: 40 TABLET, DELAYED RELEASE ORAL at 05:15

## 2019-03-01 RX ADMIN — PIPERACILLIN SODIUM AND TAZOBACTAM SODIUM 1 MLS/HR: 3; .375 INJECTION, POWDER, LYOPHILIZED, FOR SOLUTION INTRAVENOUS at 05:15

## 2019-03-01 RX ADMIN — PIPERACILLIN SODIUM AND TAZOBACTAM SODIUM 1 MLS/HR: 3; .375 INJECTION, POWDER, LYOPHILIZED, FOR SOLUTION INTRAVENOUS at 12:23

## 2019-03-01 RX ADMIN — PIPERACILLIN SODIUM AND TAZOBACTAM SODIUM SCH MLS/HR: 3; .375 INJECTION, POWDER, LYOPHILIZED, FOR SOLUTION INTRAVENOUS at 18:11

## 2019-03-01 RX ADMIN — POTASSIUM ACETATE 1 MLS/HR: 3.93 INJECTION, SOLUTION, CONCENTRATE INTRAVENOUS at 03:53

## 2019-03-01 RX ADMIN — PIPERACILLIN SODIUM AND TAZOBACTAM SODIUM SCH MLS/HR: 3; .375 INJECTION, POWDER, LYOPHILIZED, FOR SOLUTION INTRAVENOUS at 00:15

## 2019-03-01 RX ADMIN — ATORVASTATIN CALCIUM 1 MG: 10 TABLET, FILM COATED ORAL at 20:54

## 2019-03-01 NOTE — PN
Date/Time of Note


Date/Time of Note


DATE: 3/1/19 


TIME: 13:53





Assessment/Plan


VTE Prophylaxis


Risk score (from Ns)>0 risk:  2


SCD applied (from Ns):  No


SCD contraindicated:  other


Pharmacological prophylaxis:  heparin





Lines/Catheters


IV Catheter Type (from [facility]):  Peripheral IV





Assessment/Plan


Hospital Course


S: Patient had no fevers overnight.  Still complaining of some headache sympt


oms.  Seen by speech therapy physical therapy teams earlier today.





O: VS - see below


PHYSICAL EXAMINATION:





GENERAL: lying in bed, family members at the bedside.  No acute distress.


HEENT:  Pupils equal, round, react to light.  Extraocular muscles intact.


NECK:  Supple, no thyromegaly.


LUNGS:  Clear to auscultation bilaterally.


CARDIOVASCULAR:  S1, S2 heard.  No murmurs, rubs or gallops.


ABDOMEN:  Mild tenderness to palpation in the epigastric and suprapubic area, 


otherwise no rebound or guarding.  Normal bowel sounds otherwise.


MUSCULOSKELETAL:  No lower extremity edema bilaterally.


NEUROLOGIC:  No apparent focal deficits.











 


ASSESSMENT AND PLAN:  79-year-old female coming in with nausea, vomiting, 


abdominal pain, fevers, and chills, signs of sepsis secondary to UTIs.





1.  Sepsis secondary to urinary tract infection-slowly improving.  No fevers 


overnight.  White blood cell count more improved today.  Patient initially 


presented with fevers and chills, nausea, vomiting symptoms, decreased p.o. 


intake.  UA positive for UTI


   -For now continue broad spectrum antibiotics,


   -Continue PT and OT, speech therapy consult.  Follow up final culture 


results.  


   -Continue low dose IV fluids, Tylenol p.r.n. pain and fevers.  


   -Given headache symptoms, and some numbness symptoms, will go ahead and order


head CT rule out TIA versus other


2.  History of diabetes-sugar stable, A1c equals 7.8


   -Continue glargine insulin, both sliding scale insulin 


3.  History of high cholesterol.- Continue statin.


4.  Hypertension.  Blood pressure stable.  


   - Continue to monitor for now.  


   - Hydralazine p.r.n. systolic greater than 160.


6.  Gastrointestinal prophylaxis with PPI and deep venous thrombosis 


prophylaxis, heparin subcu.


Result Diagram:  


3/1/19 0524                                                                     


          3/1/19 0524





Results 24hrs





Laboratory Tests


Test
                 2/28/19
14:20  2/28/19
18:08  2/28/19
20:19  2/28/19
21:25


Lactic Acid Level             0.8                           1.1


Lipase                         78


Free Thyroxine               1.56


Bedside Glucose                              135                           125


Test
                  3/1/19
02:16   3/1/19
02:42   3/1/19
05:14   3/1/19
05:24


Bedside Glucose                99                           104


Lactic Acid Level                            0.7


White Blood Count                                                         8.1  #


Red Blood Count                                                          3.69  L


Hemoglobin                                                               11.1  L


Hematocrit                                                               35.0  L


Mean Corpuscular                                                          94.9


Volume


Mean Corpuscular                                                          30.1


Hemoglobin


Mean Corpuscular      
              
              
                   31.7  L



Hemoglobin
Concent


Red Cell                                                                  13.7


Distribution Width


Platelet Count                                                             211


Mean Platelet Volume                                                       9.7


Immature                                                                 0.400


Granulocytes %


Neutrophils %                                                             74.4


Lymphocytes %                                                             16.5


Monocytes %                                                                7.2


Eosinophils %                                                              1.1


Basophils %                                                                0.4


Nucleated Red Blood                                                        0.0


Cells %


Immature                                                                 0.030


Granulocytes #


Neutrophils #                                                              6.1


Lymphocytes #                                                              1.3


Monocytes #                                                                0.6


Eosinophils #                                                              0.1


Basophils #                                                                0.0


Nucleated Red Blood                                                        0.0


Cells #


Sodium Level                                                               139


Potassium Level                                                            4.1


Chloride Level                                                             109


Carbon Dioxide Level                                                        22


Anion Gap                                                                    8


Blood Urea Nitrogen                                                         16


Creatinine                                                                0.88


Est Glomerular        
              
              
                



Filtrat Rate
mL/min


Glucose Level                                                             102  #


Hemoglobin A1c                                                            7.8  H


Calcium Level                                                             7.9  L


Phosphorus Level                                                           2.8


Magnesium Level                                                            1.8


Triglycerides Level                                                        110


Cholesterol Level                                                          77  L


LDL Cholesterol,                                                            36


Calculated


HDL Cholesterol                                                            19  L


Cholesterol/HDL                                                            4.0


Ratio


Thyroid Stimulating   
              
              
                   1.750  



Hormone
(TSH)


Test
                  3/1/19
08:20   3/1/19
08:42   3/1/19
13:39  



Bedside Glucose                85                           127


Lactic Acid Level                            1.2








Exam/Review of Systems


Exam


Vitals





Vital Signs


  Date      Temp  Pulse  Resp  B/P (MAP)   Pulse Ox  O2          O2 Flow    FiO2


Time                                                 Delivery    Rate


    3/1/19  98.3     80    18      118/59        99


     07:46                           (78)


   2/28/19                                           Nasal             2.0


     18:37                                           Cannula








Intake and Output





2/28/19


2/28/19


3/1/19





1515:00


23:00


07:00





IntakeIntake Total


1820 ml


950 ml





BalanceBalance


1820 ml


950 ml














Results


Results 24hrs





Laboratory Tests


Test
                 2/28/19
14:20  2/28/19
18:08  2/28/19
20:19  2/28/19
21:25


Lactic Acid Level             0.8                           1.1


Lipase                         78


Free Thyroxine               1.56


Bedside Glucose                              135                           125


Test
                  3/1/19
02:16   3/1/19
02:42   3/1/19
05:14   3/1/19
05:24


Bedside Glucose                99                           104


Lactic Acid Level                            0.7


White Blood Count                                                         8.1  #


Red Blood Count                                                          3.69  L


Hemoglobin                                                               11.1  L


Hematocrit                                                               35.0  L


Mean Corpuscular                                                          94.9


Volume


Mean Corpuscular                                                          30.1


Hemoglobin


Mean Corpuscular      
              
              
                   31.7  L



Hemoglobin
Concent


Red Cell                                                                  13.7


Distribution Width


Platelet Count                                                             211


Mean Platelet Volume                                                       9.7


Immature                                                                 0.400


Granulocytes %


Neutrophils %                                                             74.4


Lymphocytes %                                                             16.5


Monocytes %                                                                7.2


Eosinophils %                                                              1.1


Basophils %                                                                0.4


Nucleated Red Blood                                                        0.0


Cells %


Immature                                                                 0.030


Granulocytes #


Neutrophils #                                                              6.1


Lymphocytes #                                                              1.3


Monocytes #                                                                0.6


Eosinophils #                                                              0.1


Basophils #                                                                0.0


Nucleated Red Blood                                                        0.0


Cells #


Sodium Level                                                               139


Potassium Level                                                            4.1


Chloride Level                                                             109


Carbon Dioxide Level                                                        22


Anion Gap                                                                    8


Blood Urea Nitrogen                                                         16


Creatinine                                                                0.88


Est Glomerular        
              
              
                



Filtrat Rate
mL/min


Glucose Level                                                             102  #


Hemoglobin A1c                                                            7.8  H


Calcium Level                                                             7.9  L


Phosphorus Level                                                           2.8


Magnesium Level                                                            1.8


Triglycerides Level                                                        110


Cholesterol Level                                                          77  L


LDL Cholesterol,                                                            36


Calculated


HDL Cholesterol                                                            19  L


Cholesterol/HDL                                                            4.0


Ratio


Thyroid Stimulating   
              
              
                   1.750  



Hormone
(TSH)


Test
                  3/1/19
08:20   3/1/19
08:42   3/1/19
13:39  



Bedside Glucose                85                           127


Lactic Acid Level                            1.2








Medications


Medication





Current Medications


IV Flush (NS 3 ml) 3 ml PER PROTOCOL IV ;  Start 2/28/19 at 15:00


Ondansetron HCl (Zofran Inj) 4 mg Q6H  PRN IV NAUSEA/VOMITING;  Start 2/28/19 at


15:00


Acetaminophen (Tylenol Tab) 650 mg Q6H  PRN PO .PAIN 1-3 OR TEMP Last 


administered on 2/28/19at 21:23; Admin Dose 650 MG;  Start 2/28/19 at 15:00


Acetaminophen/ Hydrocodone Bitart (Norco (5/325)) 1 tab Q6H  PRN PO .MOD PAIN 4-


6;  Start 2/28/19 at 15:00


Morphine Sulfate (morphine) 2 mg Q4H  PRN IV .SEVERE PAIN 7-10 Last administered


on 3/1/19at 08:35; Admin Dose 2 MG;  Start 2/28/19 at 15:00


Docusate Sodium (Colace) 100 mg Q12H  PRN PO .CONSTIPATION Last administered on 


3/1/19at 05:15; Admin Dose 100 MG;  Start 2/28/19 at 15:00


Magnesium Hydroxide (Milk Of Mag) 30 ml DAILY  PRN PO .CONSTIPATION;  Start 


2/28/19 at 15:00


Pantoprazole (Protonix Tab) 40 mg DAILY@06 PO  Last administered on 3/1/19at 


05:15; Admin Dose 40 MG;  Start 3/1/19 at 06:00


Heparin Sodium (Porcine) (Heparin  (5000 Units/1ml)) 5,000 unit Q12 SC  Last 


administered on 3/1/19at 08:24; Admin Dose 5,000 UNIT;  Start 2/28/19 at 21:00


Sodium Chloride 1,000 ml @  75 mls/hr [id number] IV  Last administered on 3/1/19at 


09:55; Admin Dose 75 MLS/HR;  Start 2/28/19 at 14:33


Lorazepam (Ativan) 0.5 mg Q6H  PRN IV ANXIETY;  Start 2/28/19 at 15:00


Albuterol/ Ipratropium (Duoneb) 3 ml Q4H RESP THERAPY  PRN HHN SHORTNESS OF 


BREATH;  Start 2/28/19 at 15:00


Piperacillin Sod/ Tazobactam Sod 100 ml @  200 mls/hr Q6 IVPB  Last administered


on 3/1/19at 12:23; Admin Dose 200 MLS/HR;  Start 2/28/19 at 18:00


Nitroglycerin (Nitroglycerin (Sl Tab) 0.4 Mg) 1 tab Q5M  PRN SL ANGINA;  Start 


2/28/19 at 15:00


Diagnostic Test (Pha) (Accu-Chek) 1 ea 02 XX ;  Start 3/1/19 at 02:00


Insulin Aspart (Novolog Insulin Pen) NOVOLOG *MILD* ALGORI... Q4 SC ;  Start 


2/28/19 at 17:00


Atorvastatin Calcium (Lipitor) 10 mg QHS PO  Last administered on 2/28/19at 


21:23; Admin Dose 10 MG;  Start 2/28/19 at 21:00


Metoprolol Succinate (Toprol Xl) 25 mg DAILY PO  Last administered on 3/1/19at 


08:22; Admin Dose 25 MG;  Start 3/1/19 at 09:00


Miscellaneous Information 1 ea NOTE XX ;  Start 2/28/19 at 15:00


Glucose (Glutose) 15 gm Q15M  PRN PO DECREASED GLUCOSE;  Start 2/28/19 at 15:00


Glucose (Glutose) 22.5 gm Q15M  PRN PO DECREASED GLUCOSE;  Start 2/28/19 at 


15:00


Dextrose (D50w Syringe) 25 ml Q15M  PRN IV DECREASED GLUCOSE;  Start 2/28/19 at 


15:00


Dextrose (D50w Syringe) 50 ml Q15M  PRN IV DECREASED GLUCOSE;  Start 2/28/19 at 


15:00


Glucagon (Glucagen) 1 mg Q15M  PRN IM DECREASED GLUCOSE;  Start 2/28/19 at 15:00


Glucose (Glutose) 15 gm Q15M  PRN BUCCAL DECREASED GLUCOSE;  Start 2/28/19 at 


15:00


Insulin Glargine (Lantus) 16 units DAILY SC  Last administered on 3/1/19at 


08:24; Admin Dose 16 UNITS;  Start 3/1/19 at 09:00


Ciprofloxacin HCl (Ciloxan 0.3% Oph) 1 drop DAILY BOTH EYES  Last administered 


on 3/1/19at 10:49; Admin Dose 1 DROP;  Start 2/28/19 at 16:00


Guaifenesin (Robitussin Liquid Cup) 200 mg Q4H  PRN PO COUGH Last administered 


on 3/1/19at 10:48; Admin Dose 200 MG;  Start 3/1/19 at 10:30











CANDELARIO SHERIDAN               Mar 1, 2019 13:58

## 2019-03-02 VITALS — SYSTOLIC BLOOD PRESSURE: 116 MMHG | RESPIRATION RATE: 16 BRPM | HEART RATE: 70 BPM | DIASTOLIC BLOOD PRESSURE: 59 MMHG

## 2019-03-02 VITALS — SYSTOLIC BLOOD PRESSURE: 125 MMHG | RESPIRATION RATE: 20 BRPM | HEART RATE: 67 BPM | DIASTOLIC BLOOD PRESSURE: 56 MMHG

## 2019-03-02 VITALS — HEART RATE: 76 BPM | RESPIRATION RATE: 18 BRPM | SYSTOLIC BLOOD PRESSURE: 118 MMHG | DIASTOLIC BLOOD PRESSURE: 57 MMHG

## 2019-03-02 VITALS — SYSTOLIC BLOOD PRESSURE: 118 MMHG | DIASTOLIC BLOOD PRESSURE: 58 MMHG | HEART RATE: 72 BPM | RESPIRATION RATE: 18 BRPM

## 2019-03-02 LAB
ADD MAN DIFF?: NO
ANION GAP: 9 (ref 5–13)
BASOPHIL #: 0 10^3/UL (ref 0–0.1)
BASOPHILS %: 0.1 % (ref 0–2)
BLOOD UREA NITROGEN: 15 MG/DL (ref 7–20)
CALCIUM: 8.2 MG/DL (ref 8.4–10.2)
CARBON DIOXIDE: 20 MMOL/L (ref 21–31)
CHLORIDE: 108 MMOL/L (ref 97–110)
CREATININE: 0.79 MG/DL (ref 0.44–1)
EOSINOPHILS #: 0 10^3/UL (ref 0–0.5)
EOSINOPHILS %: 0.5 % (ref 0–7)
GLUCOSE: 86 MG/DL (ref 70–220)
HEMATOCRIT: 33.2 % (ref 37–47)
HEMOGLOBIN: 11.1 G/DL (ref 12–16)
IMMATURE GRANS #M: 0.04 10^3/UL (ref 0–0.03)
IMMATURE GRANS % (M): 0.5 % (ref 0–0.43)
LYMPHOCYTES #: 1.7 10^3/UL (ref 0.8–2.9)
LYMPHOCYTES %: 21.9 % (ref 15–51)
MEAN CORPUSCULAR HEMOGLOBIN: 31.3 PG (ref 29–33)
MEAN CORPUSCULAR HGB CONC: 33.4 G/DL (ref 32–37)
MEAN CORPUSCULAR VOLUME: 93.5 FL (ref 82–101)
MEAN PLATELET VOLUME: 9.8 FL (ref 7.4–10.4)
MONOCYTE #: 0.4 10^3/UL (ref 0.3–0.9)
MONOCYTES %: 5.7 % (ref 0–11)
NEUTROPHIL #: 5.4 10^3/UL (ref 1.6–7.5)
NEUTROPHILS %: 71.3 % (ref 39–77)
NUCLEATED RED BLOOD CELLS #: 0 10^3/UL (ref 0–0)
NUCLEATED RED BLOOD CELLS%: 0 /100WBC (ref 0–0)
PLATELET COUNT: 260 10^3/UL (ref 140–415)
POTASSIUM: 4.2 MMOL/L (ref 3.5–5.1)
RED BLOOD COUNT: 3.55 10^6/UL (ref 4.2–5.4)
RED CELL DISTRIBUTION WIDTH: 13 % (ref 11.5–14.5)
SODIUM: 137 MMOL/L (ref 135–144)
WHITE BLOOD COUNT: 7.6 10^3/UL (ref 4.8–10.8)

## 2019-03-02 RX ADMIN — PIPERACILLIN SODIUM AND TAZOBACTAM SODIUM 1 MLS/HR: 3; .375 INJECTION, POWDER, LYOPHILIZED, FOR SOLUTION INTRAVENOUS at 05:29

## 2019-03-02 RX ADMIN — PIPERACILLIN SODIUM AND TAZOBACTAM SODIUM 1 MLS/HR: 3; .375 INJECTION, POWDER, LYOPHILIZED, FOR SOLUTION INTRAVENOUS at 17:55

## 2019-03-02 RX ADMIN — POTASSIUM ACETATE 1 MLS/HR: 3.93 INJECTION, SOLUTION, CONCENTRATE INTRAVENOUS at 17:56

## 2019-03-02 RX ADMIN — ATORVASTATIN CALCIUM 1 MG: 10 TABLET, FILM COATED ORAL at 20:36

## 2019-03-02 RX ADMIN — INSULIN ASPART 1 UNIT: 100 INJECTION, SOLUTION INTRAVENOUS; SUBCUTANEOUS at 13:29

## 2019-03-02 RX ADMIN — METOPROLOL SUCCINATE SCH MG: 25 TABLET, EXTENDED RELEASE ORAL at 08:31

## 2019-03-02 RX ADMIN — PIPERACILLIN SODIUM AND TAZOBACTAM SODIUM SCH MLS/HR: 3; .375 INJECTION, POWDER, LYOPHILIZED, FOR SOLUTION INTRAVENOUS at 13:12

## 2019-03-02 RX ADMIN — PANTOPRAZOLE SODIUM 1 MG: 40 TABLET, DELAYED RELEASE ORAL at 08:31

## 2019-03-02 RX ADMIN — PANTOPRAZOLE SODIUM 1 MG: 40 TABLET, DELAYED RELEASE ORAL at 05:29

## 2019-03-02 RX ADMIN — PIPERACILLIN SODIUM AND TAZOBACTAM SODIUM SCH MLS/HR: 3; .375 INJECTION, POWDER, LYOPHILIZED, FOR SOLUTION INTRAVENOUS at 05:29

## 2019-03-02 RX ADMIN — PIPERACILLIN SODIUM AND TAZOBACTAM SODIUM SCH MLS/HR: 3; .375 INJECTION, POWDER, LYOPHILIZED, FOR SOLUTION INTRAVENOUS at 17:55

## 2019-03-02 RX ADMIN — INSULIN ASPART 1 UNIT: 100 INJECTION, SOLUTION INTRAVENOUS; SUBCUTANEOUS at 17:54

## 2019-03-02 RX ADMIN — INSULIN ASPART 1 UNIT: 100 INJECTION, SOLUTION INTRAVENOUS; SUBCUTANEOUS at 08:00

## 2019-03-02 RX ADMIN — HEPARIN SODIUM SCH UNIT: 5000 INJECTION, SOLUTION INTRAVENOUS; SUBCUTANEOUS at 09:20

## 2019-03-02 RX ADMIN — HEPARIN SODIUM 1 UNIT: 5000 INJECTION, SOLUTION INTRAVENOUS; SUBCUTANEOUS at 20:42

## 2019-03-02 RX ADMIN — PIPERACILLIN SODIUM AND TAZOBACTAM SODIUM SCH MLS/HR: 3; .375 INJECTION, POWDER, LYOPHILIZED, FOR SOLUTION INTRAVENOUS at 00:15

## 2019-03-02 RX ADMIN — INSULIN GLARGINE 1 UNITS: 100 INJECTION, SOLUTION SUBCUTANEOUS at 09:20

## 2019-03-02 RX ADMIN — PANTOPRAZOLE SODIUM SCH MG: 40 TABLET, DELAYED RELEASE ORAL at 08:31

## 2019-03-02 RX ADMIN — METOPROLOL SUCCINATE 1 MG: 25 TABLET, EXTENDED RELEASE ORAL at 08:31

## 2019-03-02 RX ADMIN — HEPARIN SODIUM 1 UNIT: 5000 INJECTION, SOLUTION INTRAVENOUS; SUBCUTANEOUS at 09:20

## 2019-03-02 RX ADMIN — INSULIN GLARGINE SCH UNITS: 100 INJECTION, SOLUTION SUBCUTANEOUS at 09:20

## 2019-03-02 RX ADMIN — CIPROFLOXACIN HYDROCHLORIDE SCH DROP: 3 SOLUTION/ DROPS OPHTHALMIC at 08:30

## 2019-03-02 RX ADMIN — GUAIFENESIN 1 MG: 100 SOLUTION ORAL at 20:36

## 2019-03-02 RX ADMIN — ATORVASTATIN CALCIUM SCH MG: 10 TABLET, FILM COATED ORAL at 20:36

## 2019-03-02 RX ADMIN — GUAIFENESIN PRN MG: 100 SOLUTION ORAL at 20:36

## 2019-03-02 RX ADMIN — POTASSIUM ACETATE 1 MLS/HR: 3.93 INJECTION, SOLUTION, CONCENTRATE INTRAVENOUS at 01:07

## 2019-03-02 RX ADMIN — HEPARIN SODIUM SCH UNIT: 5000 INJECTION, SOLUTION INTRAVENOUS; SUBCUTANEOUS at 20:42

## 2019-03-02 RX ADMIN — CALCIUM GLUCONATE SCH MLS/HR: 94 INJECTION, SOLUTION INTRAVENOUS at 01:07

## 2019-03-02 RX ADMIN — PIPERACILLIN SODIUM AND TAZOBACTAM SODIUM 1 MLS/HR: 3; .375 INJECTION, POWDER, LYOPHILIZED, FOR SOLUTION INTRAVENOUS at 13:12

## 2019-03-02 RX ADMIN — PANTOPRAZOLE SODIUM SCH MG: 40 TABLET, DELAYED RELEASE ORAL at 05:29

## 2019-03-02 RX ADMIN — INSULIN ASPART 1 UNIT: 100 INJECTION, SOLUTION INTRAVENOUS; SUBCUTANEOUS at 21:00

## 2019-03-02 RX ADMIN — ACETAMINOPHEN 1 MG: 325 TABLET, FILM COATED ORAL at 20:36

## 2019-03-02 RX ADMIN — CIPROFLOXACIN HYDROCHLORIDE 1 DROP: 3 SOLUTION/ DROPS OPHTHALMIC at 08:30

## 2019-03-02 RX ADMIN — PIPERACILLIN SODIUM AND TAZOBACTAM SODIUM 1 MLS/HR: 3; .375 INJECTION, POWDER, LYOPHILIZED, FOR SOLUTION INTRAVENOUS at 00:15

## 2019-03-02 RX ADMIN — CALCIUM GLUCONATE SCH MLS/HR: 94 INJECTION, SOLUTION INTRAVENOUS at 17:56

## 2019-03-02 NOTE — PN
Date/Time of Note


Date/Time of Note


DATE: 3/2/19 


TIME: 12:49





Assessment/Plan


VTE Prophylaxis


Risk score (from Ns)>0 risk:  4


SCD applied (from Ns):  No


SCD contraindicated:  other


Pharmacological prophylaxis:  heparin





Lines/Catheters


IV Catheter Type (from [facility]):  Peripheral IV





Assessment/Plan


Hospital Course


S: Patient more awake and alert.  No headache symptoms presently.  Head CT re


sults reviewed.





O: VS - see below


PHYSICAL EXAMINATION:





GENERAL: lying in bed, family members at the bedside.  No acute distress.


HEENT:  Pupils equal, round, react to light.  Extraocular muscles intact.


NECK:  Supple, no thyromegaly.


LUNGS:  Clear to auscultation bilaterally.


CARDIOVASCULAR:  S1, S2 heard.  No murmurs, rubs or gallops.


ABDOMEN:  Mild tenderness to palpation in the epigastric and suprapubic area, 


otherwise no rebound or guarding.  Normal bowel sounds otherwise.


MUSCULOSKELETAL:  No lower extremity edema bilaterally.


NEUROLOGIC:  No apparent focal deficits.











 


ASSESSMENT AND PLAN:  79-year-old female coming in with nausea, vomiting, 


abdominal pain, fevers, and chills, signs of sepsis secondary to UTIs.





1.  Sepsis secondary to urinary tract infection-slowly improving.  No fevers 


overnight.  White blood cell count more improved. Patient initially presented 


with fevers and chills, nausea, vomiting symptoms, decreased p.o. intake.  UA 


positive for UTI


   -For now continue broad spectrum antibiotics,


   -Continue PT and OT, speech therapy consult.  Follow up final culture 


results.  


   -Continue low dose IV fluids, Tylenol p.r.n. pain and fevers.  


2.  History of diabetes-sugar stable, A1c equals 7.8


   -Continue glargine insulin, both sliding scale insulin 


3.  History of high cholesterol.- Continue statin.


4.  Hypertension.  Blood pressure stable.  


   - Continue to monitor for now.  


   - Hydralazine p.r.n. systolic greater than 160.


6.  Gastrointestinal prophylaxis with PPI and deep venous thrombosis 


prophylaxis, heparin subcu.





Dispo: Likely home in 24 hours if her labs continue to improve; patient is able 


to ambulate with less assistance.


Result Diagram:  


3/2/19 0525                                                                     


          3/2/19 0525





Results 24hrs





Laboratory Tests


Test
                     3/1/19
13:39  3/1/19
18:10  3/1/19
20:54  3/2/19
05:25


Bedside Glucose                  127           111           132


White Blood Count                                                          7.6


Red Blood Count                                                          3.55  L


Hemoglobin                                                               11.1  L


Hematocrit                                                               33.2  L


Mean Corpuscular Volume                                                   93.5


Mean Corpuscular                                                          31.3


Hemoglobin


Mean Corpuscular          
             
             
                  33.4  



Hemoglobin
Concent


Red Cell Distribution                                                     13.0


Width


Platelet Count                                                            260  #


Mean Platelet Volume                                                       9.8


Immature Granulocytes %                                                 0.500  H


Neutrophils %                                                             71.3


Lymphocytes %                                                             21.9


Monocytes %                                                                5.7


Eosinophils %                                                              0.5


Basophils %                                                                0.1


Nucleated Red Blood                                                        0.0


Cells %


Immature Granulocytes #                                                 0.040  H


Neutrophils #                                                              5.4


Lymphocytes #                                                              1.7


Monocytes #                                                                0.4


Eosinophils #                                                              0.0


Basophils #                                                                0.0


Nucleated Red Blood                                                        0.0


Cells #


Sodium Level                                                               137


Potassium Level                                                            4.2


Chloride Level                                                             108


Carbon Dioxide Level                                                       20  L


Anion Gap                                                                    9


Blood Urea Nitrogen                                                         15


Creatinine                                                                0.79


Est Glomerular Filtrat    
             
             
               



Rate
mL/min


Glucose Level                                                               86


Calcium Level                                                             8.2  L


Test
                     3/2/19
08:28  
             
             



Bedside Glucose                   72








Exam/Review of Systems


Exam


Vitals





Vital Signs


  Date      Temp  Pulse  Resp  B/P (MAP)   Pulse Ox  O2          O2 Flow    FiO2


Time                                                 Delivery    Rate


    3/2/19                                           Nasal             2.0


     08:20                                           Cannula


    3/2/19  98.5     72    18      118/58        97


     07:31                           (78)








Intake and Output





3/1/19


3/1/19


3/2/19





1515:00


23:00


07:00





IntakeIntake Total


350 ml


865 ml


1295 ml





BalanceBalance


350 ml


865 ml


1295 ml














Results


Results 24hrs





Laboratory Tests


Test
                     3/1/19
13:39  3/1/19
18:10  3/1/19
20:54  3/2/19
05:25


Bedside Glucose                  127           111           132


White Blood Count                                                          7.6


Red Blood Count                                                          3.55  L


Hemoglobin                                                               11.1  L


Hematocrit                                                               33.2  L


Mean Corpuscular Volume                                                   93.5


Mean Corpuscular                                                          31.3


Hemoglobin


Mean Corpuscular          
             
             
                  33.4  



Hemoglobin
Concent


Red Cell Distribution                                                     13.0


Width


Platelet Count                                                            260  #


Mean Platelet Volume                                                       9.8


Immature Granulocytes %                                                 0.500  H


Neutrophils %                                                             71.3


Lymphocytes %                                                             21.9


Monocytes %                                                                5.7


Eosinophils %                                                              0.5


Basophils %                                                                0.1


Nucleated Red Blood                                                        0.0


Cells %


Immature Granulocytes #                                                 0.040  H


Neutrophils #                                                              5.4


Lymphocytes #                                                              1.7


Monocytes #                                                                0.4


Eosinophils #                                                              0.0


Basophils #                                                                0.0


Nucleated Red Blood                                                        0.0


Cells #


Sodium Level                                                               137


Potassium Level                                                            4.2


Chloride Level                                                             108


Carbon Dioxide Level                                                       20  L


Anion Gap                                                                    9


Blood Urea Nitrogen                                                         15


Creatinine                                                                0.79


Est Glomerular Filtrat    
             
             
               



Rate
mL/min


Glucose Level                                                               86


Calcium Level                                                             8.2  L


Test
                     3/2/19
08:28  
             
             



Bedside Glucose                   72








Medications


Medication





Current Medications


IV Flush (NS 3 ml) 3 ml PER PROTOCOL IV ;  Start 2/28/19 at 15:00


Ondansetron HCl (Zofran Inj) 4 mg Q6H  PRN IV NAUSEA/VOMITING Last administered 


on 3/1/19at 17:28; Admin Dose 4 MG;  Start 2/28/19 at 15:00


Acetaminophen (Tylenol Tab) 650 mg Q6H  PRN PO .PAIN 1-3 OR TEMP Last 


administered on 2/28/19at 21:23; Admin Dose 650 MG;  Start 2/28/19 at 15:00


Acetaminophen/ Hydrocodone Bitart (Norco (5/325)) 1 tab Q6H  PRN PO .MOD PAIN 4-


6 Last administered on 3/1/19at 15:29; Admin Dose 1 TAB;  Start 2/28/19 at 15:00


Docusate Sodium (Colace) 100 mg Q12H  PRN PO .CONSTIPATION Last administered on 


3/1/19at 05:15; Admin Dose 100 MG;  Start 2/28/19 at 15:00


Magnesium Hydroxide (Milk Of Mag) 30 ml DAILY  PRN PO .CONSTIPATION;  Start 


2/28/19 at 15:00


Pantoprazole (Protonix Tab) 40 mg DAILY@06 PO  Last administered on 3/2/19at 


08:31; Admin Dose 40 MG;  Start 3/1/19 at 06:00


Heparin Sodium (Porcine) (Heparin  (5000 Units/1ml)) 5,000 unit Q12 SC  Last 


administered on 3/2/19at 09:20; Admin Dose 5,000 UNIT;  Start 2/28/19 at 21:00


Sodium Chloride 1,000 ml @  75 mls/hr [id number] IV  Last administered on 3/2/19at 


01:07; Admin Dose 75 MLS/HR;  Start 2/28/19 at 14:33


Lorazepam (Ativan) 0.5 mg Q6H  PRN IV ANXIETY;  Start 2/28/19 at 15:00


Albuterol/ Ipratropium (Duoneb) 3 ml Q4H RESP THERAPY  PRN HHN SHORTNESS OF 


BREATH;  Start 2/28/19 at 15:00


Piperacillin Sod/ Tazobactam Sod 100 ml @  200 mls/hr Q6 IVPB  Last administered


on 3/2/19at 05:29; Admin Dose 200 MLS/HR;  Start 2/28/19 at 18:00


Nitroglycerin (Nitroglycerin (Sl Tab) 0.4 Mg) 1 tab Q5M  PRN SL ANGINA;  Start 


2/28/19 at 15:00


Diagnostic Test (Pha) (Accu-Chek) 1 ea 02 XX ;  Start 3/1/19 at 02:00


Atorvastatin Calcium (Lipitor) 10 mg QHS PO  Last administered on 3/1/19at 


20:54; Admin Dose 10 MG;  Start 2/28/19 at 21:00


Metoprolol Succinate (Toprol Xl) 25 mg DAILY PO  Last administered on 3/2/19at 


08:31; Admin Dose 25 MG;  Start 3/1/19 at 09:00


Miscellaneous Information 1 ea NOTE XX ;  Start 2/28/19 at 15:00


Glucose (Glutose) 15 gm Q15M  PRN PO DECREASED GLUCOSE;  Start 2/28/19 at 15:00


Glucose (Glutose) 22.5 gm Q15M  PRN PO DECREASED GLUCOSE;  Start 2/28/19 at 


15:00


Dextrose (D50w Syringe) 25 ml Q15M  PRN IV DECREASED GLUCOSE;  Start 2/28/19 at 


15:00


Dextrose (D50w Syringe) 50 ml Q15M  PRN IV DECREASED GLUCOSE;  Start 2/28/19 at 


15:00


Glucagon (Glucagen) 1 mg Q15M  PRN IM DECREASED GLUCOSE;  Start 2/28/19 at 15:00


Glucose (Glutose) 15 gm Q15M  PRN BUCCAL DECREASED GLUCOSE;  Start 2/28/19 at 


15:00


Insulin Glargine (Lantus) 16 units DAILY SC  Last administered on 3/2/19at 09:20


; Admin Dose 16 UNITS;  Start 3/1/19 at 09:00


Ciprofloxacin HCl (Ciloxan 0.3% Oph) 1 drop DAILY BOTH EYES  Last administered 


on 3/2/19at 08:30; Admin Dose 1 DROP;  Start 2/28/19 at 16:00


Guaifenesin (Robitussin Liquid Cup) 200 mg Q4H  PRN PO COUGH Last administered 


on 3/1/19at 10:48; Admin Dose 200 MG;  Start 3/1/19 at 10:30


Insulin Aspart (Novolog Insulin Pen) NOVOLOG *MILD* ALGORITHM WITH MEALS  


BEDTIME SC ;  Start 3/1/19 at 18:00


Trimethobenzamide HCl (Tigan) 200 mg Q6H  PRN IM NAUSEA AND/OR VOMITING;  Start 


3/1/19 at 17:30











CANDELARIO SHERIDAN               Mar 2, 2019 12:50

## 2019-03-03 VITALS — HEART RATE: 71 BPM | RESPIRATION RATE: 20 BRPM | DIASTOLIC BLOOD PRESSURE: 60 MMHG | SYSTOLIC BLOOD PRESSURE: 128 MMHG

## 2019-03-03 VITALS — RESPIRATION RATE: 20 BRPM | DIASTOLIC BLOOD PRESSURE: 55 MMHG | SYSTOLIC BLOOD PRESSURE: 111 MMHG | HEART RATE: 91 BPM

## 2019-03-03 VITALS — RESPIRATION RATE: 16 BRPM | SYSTOLIC BLOOD PRESSURE: 106 MMHG | HEART RATE: 67 BPM | DIASTOLIC BLOOD PRESSURE: 56 MMHG

## 2019-03-03 LAB
ADD MAN DIFF?: NO
ANION GAP: 8 (ref 5–13)
BASOPHIL #: 0 10^3/UL (ref 0–0.1)
BASOPHILS %: 0.4 % (ref 0–2)
BLOOD UREA NITROGEN: 13 MG/DL (ref 7–20)
CALCIUM: 8.3 MG/DL (ref 8.4–10.2)
CARBON DIOXIDE: 20 MMOL/L (ref 21–31)
CHLORIDE: 111 MMOL/L (ref 97–110)
CREATININE: 0.9 MG/DL (ref 0.44–1)
EOSINOPHILS #: 0.1 10^3/UL (ref 0–0.5)
EOSINOPHILS %: 1.9 % (ref 0–7)
GLUCOSE: 112 MG/DL (ref 70–220)
HEMATOCRIT: 32.4 % (ref 37–47)
HEMOGLOBIN: 10.7 G/DL (ref 12–16)
IMMATURE GRANS #M: 0.02 10^3/UL (ref 0–0.03)
IMMATURE GRANS % (M): 0.4 % (ref 0–0.43)
LYMPHOCYTES #: 1.3 10^3/UL (ref 0.8–2.9)
LYMPHOCYTES %: 23.5 % (ref 15–51)
MEAN CORPUSCULAR HEMOGLOBIN: 30.3 PG (ref 29–33)
MEAN CORPUSCULAR HGB CONC: 33 G/DL (ref 32–37)
MEAN CORPUSCULAR VOLUME: 91.8 FL (ref 82–101)
MEAN PLATELET VOLUME: 9.8 FL (ref 7.4–10.4)
MONOCYTE #: 0.5 10^3/UL (ref 0.3–0.9)
MONOCYTES %: 8.5 % (ref 0–11)
NEUTROPHIL #: 3.5 10^3/UL (ref 1.6–7.5)
NEUTROPHILS %: 65.3 % (ref 39–77)
NUCLEATED RED BLOOD CELLS #: 0 10^3/UL (ref 0–0)
NUCLEATED RED BLOOD CELLS%: 0 /100WBC (ref 0–0)
PLATELET COUNT: 280 10^3/UL (ref 140–415)
POTASSIUM: 3.9 MMOL/L (ref 3.5–5.1)
RED BLOOD COUNT: 3.53 10^6/UL (ref 4.2–5.4)
RED CELL DISTRIBUTION WIDTH: 13.2 % (ref 11.5–14.5)
SODIUM: 139 MMOL/L (ref 135–144)
WHITE BLOOD COUNT: 5.3 10^3/UL (ref 4.8–10.8)

## 2019-03-03 RX ADMIN — GUAIFENESIN 1 MG: 100 SOLUTION ORAL at 12:40

## 2019-03-03 RX ADMIN — CALCIUM GLUCONATE SCH MLS/HR: 94 INJECTION, SOLUTION INTRAVENOUS at 10:29

## 2019-03-03 RX ADMIN — PIPERACILLIN SODIUM AND TAZOBACTAM SODIUM SCH MLS/HR: 3; .375 INJECTION, POWDER, LYOPHILIZED, FOR SOLUTION INTRAVENOUS at 13:21

## 2019-03-03 RX ADMIN — GUAIFENESIN 1 MG: 100 SOLUTION ORAL at 08:12

## 2019-03-03 RX ADMIN — GUAIFENESIN PRN MG: 100 SOLUTION ORAL at 12:40

## 2019-03-03 RX ADMIN — HEPARIN SODIUM 1 UNIT: 5000 INJECTION, SOLUTION INTRAVENOUS; SUBCUTANEOUS at 08:26

## 2019-03-03 RX ADMIN — HEPARIN SODIUM SCH UNIT: 5000 INJECTION, SOLUTION INTRAVENOUS; SUBCUTANEOUS at 08:26

## 2019-03-03 RX ADMIN — PIPERACILLIN SODIUM AND TAZOBACTAM SODIUM SCH MLS/HR: 3; .375 INJECTION, POWDER, LYOPHILIZED, FOR SOLUTION INTRAVENOUS at 00:59

## 2019-03-03 RX ADMIN — METOPROLOL SUCCINATE 1 MG: 25 TABLET, EXTENDED RELEASE ORAL at 08:13

## 2019-03-03 RX ADMIN — PIPERACILLIN SODIUM AND TAZOBACTAM SODIUM 1 MLS/HR: 3; .375 INJECTION, POWDER, LYOPHILIZED, FOR SOLUTION INTRAVENOUS at 00:59

## 2019-03-03 RX ADMIN — CIPROFLOXACIN HYDROCHLORIDE SCH DROP: 3 SOLUTION/ DROPS OPHTHALMIC at 10:28

## 2019-03-03 RX ADMIN — INSULIN ASPART 1 UNIT: 100 INJECTION, SOLUTION INTRAVENOUS; SUBCUTANEOUS at 08:00

## 2019-03-03 RX ADMIN — INSULIN GLARGINE 1 UNITS: 100 INJECTION, SOLUTION SUBCUTANEOUS at 08:26

## 2019-03-03 RX ADMIN — CIPROFLOXACIN HYDROCHLORIDE 1 DROP: 3 SOLUTION/ DROPS OPHTHALMIC at 10:28

## 2019-03-03 RX ADMIN — GUAIFENESIN PRN MG: 100 SOLUTION ORAL at 08:12

## 2019-03-03 RX ADMIN — PIPERACILLIN SODIUM AND TAZOBACTAM SODIUM 1 MLS/HR: 3; .375 INJECTION, POWDER, LYOPHILIZED, FOR SOLUTION INTRAVENOUS at 13:21

## 2019-03-03 RX ADMIN — PIPERACILLIN SODIUM AND TAZOBACTAM SODIUM SCH MLS/HR: 3; .375 INJECTION, POWDER, LYOPHILIZED, FOR SOLUTION INTRAVENOUS at 05:50

## 2019-03-03 RX ADMIN — PIPERACILLIN SODIUM AND TAZOBACTAM SODIUM 1 MLS/HR: 3; .375 INJECTION, POWDER, LYOPHILIZED, FOR SOLUTION INTRAVENOUS at 05:50

## 2019-03-03 RX ADMIN — POTASSIUM ACETATE 1 MLS/HR: 3.93 INJECTION, SOLUTION, CONCENTRATE INTRAVENOUS at 10:29

## 2019-03-03 RX ADMIN — METOPROLOL SUCCINATE SCH MG: 25 TABLET, EXTENDED RELEASE ORAL at 08:13

## 2019-03-03 RX ADMIN — INSULIN GLARGINE SCH UNITS: 100 INJECTION, SOLUTION SUBCUTANEOUS at 08:26

## 2019-03-03 RX ADMIN — INSULIN ASPART 1 UNIT: 100 INJECTION, SOLUTION INTRAVENOUS; SUBCUTANEOUS at 13:25

## 2019-03-03 NOTE — DS
Date/Time of Note


Date/Time of Note


DATE: 3/3/19 


TIME: 13:57





Discharge Summary


Admission/Discharge Info


Admit Date/Time


Feb 28, 2019 at 13:24


Discharge Date/Time





Discharge Diagnosis


1.  Sepsis secondary to urinary tract infection-resolved


2.  History of diabetes-sugar stable, A1c equals 7.8


3.  History of high cholesterol


4.  Hypertension


Patient Condition:  Stable


Hx of Present Illness


 79-year-old female with past medical history of high blood pressure, high 


cholesterol, diabetes, prior cholecystectomy, with biliary stent placement, who 


has been having some fever and chills at home.  She is also complaining of some 


abdominal pain.  She has also had some nausea symptoms, nonbilious, nonbloody 


vomiting and symptoms have been going on for the last 3 or 4 days.  Denies any 


upper or lower GI bleeding, no diarrhea or constipation.  No chest pain, no 


shortness of breath.  She also has been feeling some mild headache symptoms and 


decreased p.o. intake.  When she came in today, she was found with slightly 


elevated white blood cell count of 11,000.  Her lactic acid was elevated at 2.1.


 She also had a fever of 102.3 and her UA showed signs of positive nitrites, 


signs of UTI and she was given antibiotics in the ER.  She also had an abdominal


ultrasound performed that showed status post cholecystectomy with physiological 


dilation of the CBD and air within the biliary tree, CBD stent not visualized by


ultrasound, simple cyst in the right kidney.  Her LFTs appear to be normal.


Hospital Course


Patient was admitted to medical surgical unit.  Found with E. coli urinary tract


infection.  Patient placed on IV fluids and antibiotics.  She also had some dry 


cough symptoms that was relieved with the antitussive medicines.  Her white 


blood cell count improved, no fevers, she was able to ambulate after being seen 


by physical therapy team, with assistance.  She was able to tolerate diet.  She 


will be discharged home later today and in improved condition with some home 


health PT being set up as well.  See below for full list of discharge 


medications.


Home Meds


Active Scripts


Levofloxacin* (Levaquin*) 500 Mg Tablet, 500 MG PO DAILY for 7 Days, #7 TAB


   Prov:CANDELARIO SHERIDAN         3/3/19


Ciprofloxacin Hcl (Ciprofloxacin Hcl) 2.5 Ml Drops, 1 DROP BOTH EYES DAILY for 3


Days, #1 BOTTLE


   Prov:CANDELARIO SHERIDAN.         3/3/19


Guaifenesin (Guaifenesin) 100 Mg/5 Ml Liquid, 200 MG PO Q4H PRN for COUGH, #1 


BOTTLE


   Prov:CANDELARIO SHERIDAN S.         3/3/19


Reported Medications


Insulin Glargine,Hum.rec.anlog (Basaglar Kwikpen U-100) 100 Unit/1 Ml Insuln.pe


n, 16 UNIT SC DAILY, EA


   4/26/18


Metoprolol Succinate* (Toprol XL*) 25 Mg Tab.sr.24h, 25 MG PO DAILY, #30 TAB


   4/26/18


Atorvastatin Calcium (Atorvastatin Calcium) 10 Mg Tablet, 10 MG PO QHS, #30 TAB


   4/26/18


Metformin* (Glucophage*) 850 Mg Tablet, 850 MG PO BID WITH MEALS, #30 TAB


   4/26/18


Discontinued Scripts


Cephalexin* (Keflex*) 500 Mg Capsule, 500 MG PO TID for 5 Days, CAP


   Prov:LATONYA PULIDO MD         5/28/18


Follow-up Plan


Please take your medications as prescribed, see your doctor in the clinic in the


next 1 week.


Primary Care Provider


Care Physician No Primary


Time spent on discharge:   > 30 minutes


Pending Labs





Laboratory Tests


Test
               3/2/19
17:46    3/2/19
20:34    3/3/19
05:29    3/3/19
08:12


Bedside                      199             180  
                          121


Glucose
          mg/dL
()  mg/dL
()                  mg/dL
()


White Blood      
                
                          5.3  



Count
                                            10^3/ul
(4.8-1


                                                            0.8)


Red Blood        
                
                         3.53  



Count
                                            10^6/ul
(4.20-


                                                           5.40)


Hemoglobin
      
                
                         10.7  



                                                  g/dl
(12.0-16.


                                                              0)


Hematocrit
      
                
                         32.4  



                                                   %
(37.0-47.0)


Mean             
                
                         91.8  



Corpuscular                                       fl
(82.0-101.0


Volume
                                                        )


Mean             
                
                         30.3  



Corpuscular                                       pg
(29.0-33.0)


Hemoglobin



Mean             
                
                         33.0  



Corpuscular                                       g/dl
(32.0-37.


Hemoglobin
Conc                                               0)


ent


Red Cell         
                
                         13.2  



Distribution                                       %
(11.5-14.5)


Width



Platelet Count
  
                
                          280  



                                                  10^3/UL
(140-4


                                                             15)


Mean Platelet    
                
                          9.8  



Volume
                                            fl
(7.4-10.4)


Immature         
                
                        0.400  



Granulocytes %
                                   %
(0.001-0.429


                                                               )


Neutrophils %
   
                
                         65.3  



                                                   %
(39.0-77.0)


Lymphocytes %
   
                
                         23.5  



                                                   %
(15.0-51.0)


Monocytes %
     
                
                          8.5  



                                                    %
(0.0-11.0)


Eosinophils %
   
                
                          1.9  



                                                     %
(0.0-7.0)


Basophils %
     
                
                          0.4  



                                                     %
(0.0-2.0)


Nucleated Red    
                
                          0.0  



Blood Cells %
                                    /100WBC
(0.0-0


                                                             .0)


Immature         
                
                        0.020  



Granulocytes #
                                   10^3/ul
(0.0-0


                                                           .031)


Neutrophils #
   
                
                          3.5  



                                                  10^3/ul
(1.6-7


                                                             .5)


Lymphocytes #
   
                
                          1.3  



                                                  10^3/ul
(0.8-2


                                                             .9)


Monocytes #
     
                
                          0.5  



                                                  10^3/ul
(0.3-0


                                                             .9)


Eosinophils #
   
                
                          0.1  



                                                  10^3/ul
(0.0-0


                                                             .5)


Basophils #
     
                
                          0.0  



                                                  10^3/ul
(0.0-0


                                                             .1)


Nucleated Red    
                
                          0.0  



Blood Cells #
                                    10^3/ul
(0.0-0


                                                             .0)


Sodium Level
    
                
                          139  



                                                  mmol/L
(135-14


                                                              4)


Potassium        
                
                          3.9  



Level
                                            mmol/L
(3.5-5.


                                                              1)


Chloride Level
  
                
                          111  



                                                  mmol/L
(


                                                               )


Carbon Dioxide   
                
                           20  



Level
                                            mmol/L
(21-31)


Anion Gap                                              8 (5-13)


Blood Urea       
                
                           13  



Nitrogen
                                           mg/dl
(7-20)


Creatinine
      
                
                         0.90  



                                                  mg/dl
(0.44-1.


                                                             00)


Est Glomerular   
                
                mL/min (>60)   



Filtrat                                           



Rate
mL/min


Glucose Level
   
                
                          112  



                                                  mg/dl
()


Calcium Level
   
                
                          8.3  



                                                  mg/dl
(8.4-10.


                                                              2)


Test
               3/3/19
12:31  
               
               



Bedside                      165  
               
               



Glucose
          mg/dL
()














CANDELARIO SHERIDAN               Mar 3, 2019 13:58

## 2019-03-03 NOTE — PDOCDIS
Discharge Instructions


CONDITION


                [id number]
Patient Condition:  [id number]
Stable








HOME CARE INSTRUCTIONS:


         [id number]
Diet Instructions:  [id number]
Low Fat /Cholesterol








ACTIVITY:


     [id number]
Activity Restrictions:  [id number]
Slowly Increase Activity


                                        Rest between Activity


                                        Avoid heavy lifting








FOLLOW UP/APPOINTMENTS


Follow-up Plan


Please take your medications as prescribed, see your doctor in the clinic in the


next 1 week.











CANDELARIO SHERIDAN               Mar 3, 2019 13:53

## 2019-06-02 ENCOUNTER — HOSPITAL ENCOUNTER (EMERGENCY)
Dept: HOSPITAL 10 - E/R | Age: 79
Discharge: HOME | End: 2019-06-02
Payer: COMMERCIAL

## 2019-06-02 ENCOUNTER — HOSPITAL ENCOUNTER (EMERGENCY)
Dept: HOSPITAL 91 - E/R | Age: 79
Discharge: HOME | End: 2019-06-02
Payer: COMMERCIAL

## 2019-06-02 VITALS
BODY MASS INDEX: 25.39 KG/M2 | HEIGHT: 56 IN | HEIGHT: 56 IN | BODY MASS INDEX: 25.39 KG/M2 | WEIGHT: 112.88 LBS | WEIGHT: 112.88 LBS

## 2019-06-02 VITALS — RESPIRATION RATE: 16 BRPM | SYSTOLIC BLOOD PRESSURE: 125 MMHG | DIASTOLIC BLOOD PRESSURE: 72 MMHG | HEART RATE: 76 BPM

## 2019-06-02 DIAGNOSIS — R51: ICD-10-CM

## 2019-06-02 DIAGNOSIS — S82.892A: Primary | ICD-10-CM

## 2019-06-02 DIAGNOSIS — Z79.4: ICD-10-CM
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
## 2019-06-02 DIAGNOSIS — I10: ICD-10-CM

## 2019-06-02 LAB
ADD MAN DIFF?: NO
ALANINE AMINOTRANSFERASE: 23 IU/L (ref 13–69)
ALBUMIN/GLOBULIN RATIO: 1.27
ALBUMIN: 4.6 G/DL (ref 3.3–4.9)
ALKALINE PHOSPHATASE: 48 IU/L (ref 42–121)
ANION GAP: 10 (ref 5–13)
ASPARTATE AMINO TRANSFERASE: 35 IU/L (ref 15–46)
BASOPHIL #: 0 10^3/UL (ref 0–0.1)
BASOPHILS %: 0.3 % (ref 0–2)
BILIRUBIN,DIRECT: 0 MG/DL (ref 0–0.2)
BILIRUBIN,TOTAL: 0.6 MG/DL (ref 0.2–1.3)
BLOOD UREA NITROGEN: 22 MG/DL (ref 7–20)
CALCIUM: 9.8 MG/DL (ref 8.4–10.2)
CARBON DIOXIDE: 27 MMOL/L (ref 21–31)
CHLORIDE: 100 MMOL/L (ref 97–110)
CREATININE: 0.81 MG/DL (ref 0.44–1)
EOSINOPHILS #: 0.1 10^3/UL (ref 0–0.5)
EOSINOPHILS %: 0.9 % (ref 0–7)
GLOBULIN: 3.6 G/DL (ref 1.3–3.2)
GLUCOSE: 201 MG/DL (ref 70–220)
HEMATOCRIT: 39.6 % (ref 37–47)
HEMOGLOBIN: 13 G/DL (ref 12–16)
IMMATURE GRANS #M: 0.03 10^3/UL (ref 0–0.03)
IMMATURE GRANS % (M): 0.3 % (ref 0–0.43)
INR: 0.9
LYMPHOCYTES #: 2 10^3/UL (ref 0.8–2.9)
LYMPHOCYTES %: 19.4 % (ref 15–51)
MEAN CORPUSCULAR HEMOGLOBIN: 30.2 PG (ref 29–33)
MEAN CORPUSCULAR HGB CONC: 32.8 G/DL (ref 32–37)
MEAN CORPUSCULAR VOLUME: 91.9 FL (ref 82–101)
MEAN PLATELET VOLUME: 9.4 FL (ref 7.4–10.4)
MONOCYTE #: 0.6 10^3/UL (ref 0.3–0.9)
MONOCYTES %: 5.7 % (ref 0–11)
NEUTROPHIL #: 7.4 10^3/UL (ref 1.6–7.5)
NEUTROPHILS %: 73.4 % (ref 39–77)
NUCLEATED RED BLOOD CELLS #: 0 10^3/UL (ref 0–0)
NUCLEATED RED BLOOD CELLS%: 0 /100WBC (ref 0–0)
PARTIAL THROMBOPLASTIN TIME: 26.6 SEC (ref 23–35)
PLATELET COUNT: 262 10^3/UL (ref 140–415)
POTASSIUM: 5 MMOL/L (ref 3.5–5.1)
PROTIME: 12.3 SEC (ref 11.9–14.9)
PT RATIO: 1
RED BLOOD COUNT: 4.31 10^6/UL (ref 4.2–5.4)
RED CELL DISTRIBUTION WIDTH: 13.4 % (ref 11.5–14.5)
SODIUM: 137 MMOL/L (ref 135–144)
TOTAL PROTEIN: 8.2 G/DL (ref 6.1–8.1)
WHITE BLOOD COUNT: 10.1 10^3/UL (ref 4.8–10.8)

## 2019-06-02 PROCEDURE — 70450 CT HEAD/BRAIN W/O DYE: CPT

## 2019-06-02 PROCEDURE — 99285 EMERGENCY DEPT VISIT HI MDM: CPT

## 2019-06-02 PROCEDURE — 29515 APPLICATION SHORT LEG SPLINT: CPT

## 2019-06-02 PROCEDURE — 85730 THROMBOPLASTIN TIME PARTIAL: CPT

## 2019-06-02 PROCEDURE — 73610 X-RAY EXAM OF ANKLE: CPT

## 2019-06-02 PROCEDURE — 80053 COMPREHEN METABOLIC PANEL: CPT

## 2019-06-02 PROCEDURE — 85025 COMPLETE CBC W/AUTO DIFF WBC: CPT

## 2019-06-02 PROCEDURE — 85610 PROTHROMBIN TIME: CPT

## 2019-06-02 PROCEDURE — 36415 COLL VENOUS BLD VENIPUNCTURE: CPT

## 2019-06-02 RX ADMIN — HYDROCODONE BITARTRATE AND ACETAMINOPHEN 1 TAB: 5; 325 TABLET ORAL at 21:48

## 2019-06-02 NOTE — ERD
ER Documentation


Chief Complaint


Chief Complaint





L ANKLE PAIN, HEAD PAIN S/P GLF





HPI


Is a very pleasant 79-year-old female suffered a mechanical fall she slipped out


of her chair and fell onto her left ankle her head.  She denies loss of 


consciousness.  She does complain of left ankle pain and inability to weight-


bear.  She denies any fevers or chills.  Denies any nausea vomiting.  Denies any


other current complaints.  Pain is mild to moderate intensity with no 


exacerbating living factors.





ROS


All systems reviewed and are negative except as per history of present illness.





Medications


Home Meds


Active Scripts


Tramadol HCl (Tramadol HCl) 50 Mg Tablet, 50 MG PO Q4 PRN for PAIN, #20 TAB


   Prov:GODFREY DUKE S.         6/2/19


Levofloxacin* (Levaquin*) 500 Mg Tablet, 500 MG PO DAILY for 7 Days, #7 TAB


   Prov:CANDELARIO SHERIDAN S.         3/3/19


Ciprofloxacin Hcl (Ciprofloxacin Hcl) 2.5 Ml Drops, 1 DROP BOTH EYES DAILY for 3


Days, #1 BOTTLE


   Prov:CANDELARIO SHERIDAN S.         3/3/19


Guaifenesin (Guaifenesin) 100 Mg/5 Ml Liquid, 200 MG PO Q4H PRN for COUGH, #1 


BOTTLE


   Prov:CANDELARIO SHERIDAN S.         3/3/19


Reported Medications


Insulin Glargine,Hum.rec.anlog (Basaglar Kwikpen U-100) 100 Unit/1 Ml 


Insuln.pen, 16 UNIT SC DAILY, EA


   4/26/18


Metoprolol Succinate* (Toprol XL*) 25 Mg Tab.sr.24h, 25 MG PO DAILY, #30 TAB


   4/26/18


Atorvastatin Calcium (Atorvastatin Calcium) 10 Mg Tablet, 10 MG PO QHS, #30 TAB


   4/26/18


Metformin* (Glucophage*) 850 Mg Tablet, 850 MG PO BID WITH MEALS, #30 TAB


   4/26/18





Allergies


Allergies:  


Coded Allergies:  


     No Known Allergy (Unverified , 9/7/17)





PMhx/Soc


History of Surgery:  Yes (cholecystectomy, hysterectomy)


Anesthesia Reaction:  No


Hx Neurological Disorder:  No


Hx Respiratory Disorders:  No


Hx Cardiac Disorders:  Yes (htn, hypercholesterolemia)


Hx Psychiatric Problems:  No


Hx Miscellaneous Medical Probl:  Yes


Hx Alcohol Use:  No


Hx Substance Use:  No


Hx Tobacco Use:  No


Smoking Status:  Never smoker





Physical Exam


Vitals





Vital Signs


  Date      Temp  Pulse  Resp  B/P (MAP)   Pulse Ox  O2          O2 Flow    FiO2


Time                                                 Delivery    Rate


    6/2/19  99.7     89    18      130/72        99  Room Air


     19:47                           (91)


    6/2/19  99.7    106    18      136/69        99


     19:20                           (91)





Physical Exam


Const:   No acute distress


Head:   Atraumatic 


Eyes:    Normal Conjunctiva


ENT:    Normal External Ears, Nose and Mouth.


Neck:               Full range of motion. No meningismus.


Resp:   Clear to auscultation bilaterally


Cardio:   Regular rate and rhythm, no murmurs


Abd:    Soft, non tender, non distended. Normal bowel sounds


Skin:   No petechiae or rashes


Back:   No midline or flank tenderness


Ext:    Mild bruising noted over the posterior aspect of the left ankle along 


with the forefoot.  Minimal swelling noted.  Normal pulses.  Normal sensation.


Neur:   Awake and alert


Psych:    Normal Mood and Affect


Result Diagram:  


6/2/19 2007 6/2/19 2007





Results 24 hrs





Laboratory Tests


              Test
                                  6/2/19
20:07


              White Blood Count                     10.1 10^3/ul


              Red Blood Count                       4.31 10^6/ul


              Hemoglobin                               13.0 g/dl


              Hematocrit                                  39.6 %


              Mean Corpuscular Volume                    91.9 fl


              Mean Corpuscular Hemoglobin                30.2 pg


              Mean Corpuscular Hemoglobin
Concent     32.8 g/dl 



              Red Cell Distribution Width                 13.4 %


              Platelet Count                         262 10^3/UL


              Mean Platelet Volume                        9.4 fl


              Immature Granulocytes %                    0.300 %


              Neutrophils %                               73.4 %


              Lymphocytes %                               19.4 %


              Monocytes %                                  5.7 %


              Eosinophils %                                0.9 %


              Basophils %                                  0.3 %


              Nucleated Red Blood Cells %            0.0 /100WBC


              Immature Granulocytes #              0.030 10^3/ul


              Neutrophils #                          7.4 10^3/ul


              Lymphocytes #                          2.0 10^3/ul


              Monocytes #                            0.6 10^3/ul


              Eosinophils #                          0.1 10^3/ul


              Basophils #                            0.0 10^3/ul


              Nucleated Red Blood Cells #            0.0 10^3/ul


              Prothrombin Time                          12.3 Sec


              Prothrombin Time Ratio                         1.0


              INR International Normalized
Ratio           0.90 



              Activated Partial
Thromboplast Time      26.6 Sec 



              Sodium Level                            137 mmol/L


              Potassium Level                         5.0 mmol/L


              Chloride Level                          100 mmol/L


              Carbon Dioxide Level                     27 mmol/L


              Anion Gap                                       10


              Blood Urea Nitrogen                       22 mg/dl


              Creatinine                              0.81 mg/dl


              Est Glomerular Filtrat Rate
mL/min    mL/min 



              Glucose Level                            201 mg/dl


              Calcium Level                            9.8 mg/dl


              Total Bilirubin                          0.6 mg/dl


              Direct Bilirubin                        0.00 mg/dl


              Indirect Bilirubin                       0.6 mg/dl


              Aspartate Amino Transf
(AST/SGOT)         35 IU/L 



              Alanine Aminotransferase
(ALT/SGPT)       23 IU/L 



              Alkaline Phosphatase                       48 IU/L


              Total Protein                             8.2 g/dl


              Albumin                                   4.6 g/dl


              Globulin                                 3.60 g/dl


              Albumin/Globulin Ratio                        1.27





Current Medications


 Medications
   Dose
          Sig/Poly
       Start Time
   Status  Last


 (Trade)       Ordered        Route
 PRN     Stop Time              Admin
Dose


                              Reason                                Admin


                1 tab          ONCE  ONCE
    6/2/19        DC            6/2/19


Acetaminophen                 PO
            22:00
 6/2/19                21:48



/
                                           22:01


Hydrocodone


Bitart



(Edison


(5/325))








Procedures/MDM


Splint Assessment: Neurovascularly intact post splint placement with good fit. 





X-ray Ankle 3V Interpreted by me: 


Bones:    Distal fibular fracture


Joints:       No dislocation





Medical decision making: Very pleasant patient comes in essentially with distal 


fibula fracture.  No evidence of loss of consciousness.  She is well-appearing. 


She is been placed in a splint with neurovascular capabilities maintained pre-


and post splint uppercase patient be given follow-up with outpatient orthopedics


.  Follow-up with PCP and Ortho as an outpatient





Departure


Diagnosis:  


   Primary Impression:  


   Fall


   Encounter type:  initial encounter  Qualified Codes:  W19.XXXA - Unspecified 


   fall, initial encounter


   Additional Impression:  


   Ankle fracture


   Encounter type:  initial encounter  Fracture type:  closed  Laterality:  left


    Qualified Codes:  S82.892A - Other fracture of left lower leg, initial 


   encounter for closed fracture


Condition:  Stable


Patient Instructions:  Treating Ankle Fractures


Referrals:  


MARICRUZ LUCERO MD, DANIEL S.              Jun 2, 2019 22:26

## 2022-09-29 NOTE — HP
Date/Time of Note


Date/Time of Note


DATE: 9/8/17 


TIME: 06:47





Assessment/Plan


VTE Prophylaxis


VTE Prophylaxis Intervention:  SCD's





Lines/Catheters


IV Catheter Type (from [facility]):  Saline Lock





Assessment/Plan


Chief Complaint/Hosp Course


This is a 77-year-old female being admitted to the telemetry floor for:





#1 symptomatic hypoglycemia: Likely multifactorial secondary to underlying 

urinary tract infection and decreased appetite and continued medication use.  

Patient received Sandostatin in the ED.  Currently will put her on a D5 water 

as she has poor appetite at this time.  Will continue to monitor blood sugars.  

Hold home diabetes medication at this time.





#2 lactic acidosis: Again likely multifactorial secondary to urinary tract 

infection, dehydration from poor appetite, and metformin.  Will provide IV 

fluid hydration at this time will hold metformin.  Provide antibiotics for 

urinary tract infection.  Will trend lactate.





#3 abdominal pain: This likely could be secondary to patient's generalized 

illness as well as UTI however in the setting of her age and being a female as 

well as lactic acidosis I will also check cardiac enzymes.  There is no rectal 

bleeding at this time.





#3 dehydration: Patient received normal saline boluses in the ED will continue 

normal saline bolus as well as D5 water secondary to #1.





#4 urinary tract infection: The current time will treat with ceftriaxone.  Will 

await urine culture





#5 DVT GI prophylaxis: SCDs, acid blocker





Further treatment strategy will be implemented as per the clinical course


Problems:  





HPI/ROS


Admit Date/Time


Admit Date/Time


Sep 7, 2017 at 22:47





Hx of Present Illness


Chief complaint: Vomiting 2-3 days





Patient is a 77-year-old female recently discharged from the hospital with UTI 

and on antibiotics who presents to the ER with gradual onset, intermittent, 

moderate nonbloody nonbilious vomiting for 2-3 days.  Her family reports that 

she has not been eating well since she left the hospital and has had 

constipation.  She has had numerous episodes of vomiting in the last 2-3 days.  

She denied fever headache and abdominal pain in the ED.  However upon my 

examination patient did report that she had some abdominal pain.  Patient is 

also poor historian





Allergies: NKDA





Medications: See MAR





ROS


Const: As per HPI


Eyes : No pain discharge or redness or change in visual acuity


ENT: No pain, sore throat, congestion, congestion,  dysphagia or discharge


Respiratory: No shortness of breath, cough, sputum, wheezing, or pleuritic pain


Cardiovascular: No chest pain, palpitation, PND, or edema


GI : As per HPI


Genitourinary: No dysuria, hematuria, flank pain ,  discharge or CVA tenderness


Musculoskeletal: No joint pain, back pain, neck pain, restricted range of 

motion in neck or joints


Skin: No rash, bruising or hives 


Neuro: No headache, dizziness, syncope, seizure, focal weakness


Endocrine: No polyuria, polydipsia, temperature intolerance


Psych: No hallucination, depression, anxiety or suicidal ideation





PMH/Family/Social


Past Medical History


Diabetes mellitus, hypertension, hyperlipidemia





Past Surgical History


Hysterectomy, cholecystectomy


Past Surgical Hx:  other





Family History


Significant Family History:  no pertinent family hx





Social History


Smoking Status:  Never smoker


Drug Use:  none





Exam/Review of Systems


Vital Signs


Vitals





 Vital Signs








  Date Time  Temp Pulse Resp B/P Pulse Ox O2 Delivery O2 Flow Rate FiO2


 


9/8/17 05:03 97.8 76 16 130/62 98   


 


9/8/17 00:13      Room Air  











Exam


Exam





General: Patient is lying in bed, lethargic however she is alert and awake


HEENT: Atraumatic, normocephalic. The pupils are equal, round and reactive. 

Extraocular motor are intact


Neck: Supple with full range of motion. No rigidity or meningismus


Chest: Nontender


Lungs: Clear to auscultation bilaterally no crackles rales or wheezing


Heart: Normal S1-S2, Regular rhythm and rate. No murmur, S3, or S4


Abdomen: Soft, tenderness to palpation over the epigastric region and lower 

abdominal region, normal bowel sounds


Extremities: Normal to inspection, no edema no cyanosis


Neurologic: Normal mental status, speech normal, cranial nerves II through XII 

are intact, motor and sensory are intact,


Additional Comments


PROCEDURE:   Abdominal radiograph


 


CLINICAL INDICATION:  Abdominal Pain. 


 


COMPARISON:  Radiograph from 08/20/2015.


 


TECHNIQUE:   AP view of the abdomen.


 


FINDINGS:


 


The visualized lung bases are clear.


 


Cholecystectomy.


Common biliary duct  stent.


 


No free air.


No distended loops of bowel.


No fluid levels.


There is a nonobstructive bowel pattern.


 


No suspicious calcifications.


Mild degenerative changes of the lower lumbar spine.


 


IMPRESSION:


 


No bowel obstruction identified.


 


 


 


RPTAT: [facility]


_____________________________________________ 


Miko Contreras Physician           Date    Time 


Electronically viewed and signed by Miko Contreras Physician on 09/07/2017 21:

18 


 


D:  09/07/2017 21:18  T:  09/07/2017 21:18


LG/





CC: LINO GUTIERREZ MD





Labs


Result Diagram:  


9/7/17 1930 9/7/17 2106








Medications


Medications





 Current Medications


Ondansetron HCl (Zofran Inj) 4 mg Q6H  PRN IV NAUSEA AND/OR VOMITING;  Start 9/8 /17 at 01:30


Acetaminophen (Tylenol Tab) 650 mg Q6H  PRN PO PAIN LEVEL 1-3 OR FEVER;  Start 9 /8/17 at 01:30


Morphine Sulfate (morphine) 2 mg Q4H  PRN IV PAIN LEVEL 7-10;  Start 9/8/17 at 

01:30


Docusate Sodium (Colace) 100 mg Q12H  PRN PO CONSTIPATION;  Start 9/8/17 at 01:

30


Bisacodyl (Dulcolax) 5 mg DAILY  PRN PO CONSTIPATION;  Start 9/8/17 at 01:30


Famotidine (Pepcid Iv) 20 mg DAILY IV  Last administered on 9/8/17at 01:30; 

Admin Dose 20 MG;  Start 9/8/17 at 01:30


Diagnostic Test (Pha) (Accu-Chek) 1 ea 02 XX  Last administered on 9/8/17at 02:

00; Admin Dose 1 EA;  Start 9/8/17 at 02:00


Insulin Aspart (Novolog Insulin Pen) NOVOLOG *MILD* ALGORI... Q4 SC ;  Start 9/8 /17 at 05:00


Miscellaneous Information 1 ea NOTE XX ;  Start 9/8/17 at 01:30


Glucose (Glutose) 15 gm Q15M  PRN PO DECREASED GLUCOSE;  Start 9/8/17 at 01:30


Glucose (Glutose) 22.5 gm Q15M  PRN PO DECREASED GLUCOSE;  Start 9/8/17 at 01:30


Dextrose (D50w Syringe) 25 ml Q15M  PRN IV DECREASED GLUCOSE;  Start 9/8/17 at 

01:30


Dextrose (D50w Syringe) 50 ml Q15M  PRN IV DECREASED GLUCOSE;  Start 9/8/17 at 

01:30


Glucagon (Glucagen) 1 mg Q15M  PRN IM DECREASED GLUCOSE;  Start 9/8/17 at 01:30


Glucose 15 gm 15 gm Q15M  PRN BUCCAL DECREASED GLUCOSE;  Start 9/8/17 at 01:30


Sodium Chloride 1,000 ml @  75 mls/hr [id number] ONCE IV  Last administered on 9/8/ 17at 03:25; Admin Dose 75 MLS/HR;  Start 9/8/17 at 03:09;  Stop 9/8/17 at 16:28


Dextrose/Sodium Chloride (D5-NS) 1,000 ml @  70 mls/hr [id number] IV ;  Start 9/8/ 17 at 13:30











MONTSE HOWELL Sep 8, 2017 06:50 normal balance